# Patient Record
Sex: MALE | Race: WHITE | NOT HISPANIC OR LATINO | URBAN - METROPOLITAN AREA
[De-identification: names, ages, dates, MRNs, and addresses within clinical notes are randomized per-mention and may not be internally consistent; named-entity substitution may affect disease eponyms.]

---

## 2020-09-08 ENCOUNTER — INPATIENT (INPATIENT)
Facility: HOSPITAL | Age: 75
LOS: 3 days | Discharge: HOME HEALTH | DRG: 377 | End: 2020-09-12
Attending: INTERNAL MEDICINE | Admitting: INTERNAL MEDICINE
Payer: MEDICARE

## 2020-09-08 VITALS
TEMPERATURE: 98 F | RESPIRATION RATE: 18 BRPM | OXYGEN SATURATION: 98 % | HEIGHT: 74 IN | HEART RATE: 104 BPM | SYSTOLIC BLOOD PRESSURE: 109 MMHG | DIASTOLIC BLOOD PRESSURE: 78 MMHG | WEIGHT: 199.96 LBS

## 2020-09-08 DIAGNOSIS — D64.9 ANEMIA, UNSPECIFIED: ICD-10-CM

## 2020-09-08 DIAGNOSIS — Z29.9 ENCOUNTER FOR PROPHYLACTIC MEASURES, UNSPECIFIED: ICD-10-CM

## 2020-09-08 DIAGNOSIS — K13.70 UNSPECIFIED LESIONS OF ORAL MUCOSA: ICD-10-CM

## 2020-09-08 DIAGNOSIS — N17.9 ACUTE KIDNEY FAILURE, UNSPECIFIED: ICD-10-CM

## 2020-09-08 DIAGNOSIS — R53.1 WEAKNESS: ICD-10-CM

## 2020-09-08 DIAGNOSIS — R63.4 ABNORMAL WEIGHT LOSS: ICD-10-CM

## 2020-09-08 LAB
ANION GAP SERPL CALC-SCNC: 18 MMOL/L — HIGH (ref 5–17)
APPEARANCE UR: ABNORMAL
BASE EXCESS BLDV CALC-SCNC: -2.7 MMOL/L — SIGNIFICANT CHANGE UP
BILIRUB UR-MCNC: ABNORMAL
BUN SERPL-MCNC: 79 MG/DL — HIGH (ref 7–23)
CA-I SERPL-SCNC: 1.06 MMOL/L — LOW (ref 1.12–1.3)
CALCIUM SERPL-MCNC: 8.9 MG/DL — SIGNIFICANT CHANGE UP (ref 8.4–10.5)
CHLORIDE SERPL-SCNC: 107 MMOL/L — SIGNIFICANT CHANGE UP (ref 96–108)
CO2 SERPL-SCNC: 19 MMOL/L — LOW (ref 22–31)
COLOR SPEC: YELLOW — SIGNIFICANT CHANGE UP
CREAT SERPL-MCNC: 1.26 MG/DL — SIGNIFICANT CHANGE UP (ref 0.5–1.3)
DIFF PNL FLD: NEGATIVE — SIGNIFICANT CHANGE UP
GAS PNL BLDV: 140 MMOL/L — SIGNIFICANT CHANGE UP (ref 138–146)
GAS PNL BLDV: SIGNIFICANT CHANGE UP
GAS PNL BLDV: SIGNIFICANT CHANGE UP
GLUCOSE SERPL-MCNC: 133 MG/DL — HIGH (ref 70–99)
GLUCOSE UR QL: NEGATIVE — SIGNIFICANT CHANGE UP
HCO3 BLDV-SCNC: 24 MMOL/L — SIGNIFICANT CHANGE UP (ref 20–27)
KETONES UR-MCNC: 15 MG/DL
LEUKOCYTE ESTERASE UR-ACNC: NEGATIVE — SIGNIFICANT CHANGE UP
NITRITE UR-MCNC: NEGATIVE — SIGNIFICANT CHANGE UP
PCO2 BLDV: 48 MMHG — SIGNIFICANT CHANGE UP (ref 41–51)
PH BLDV: 7.31 — LOW (ref 7.32–7.43)
PH UR: 5.5 — SIGNIFICANT CHANGE UP (ref 5–8)
PO2 BLDV: 37 MMHG — SIGNIFICANT CHANGE UP
POTASSIUM BLDV-SCNC: 3.8 MMOL/L — SIGNIFICANT CHANGE UP (ref 3.5–4.9)
POTASSIUM SERPL-MCNC: 4.3 MMOL/L — SIGNIFICANT CHANGE UP (ref 3.5–5.3)
POTASSIUM SERPL-SCNC: 4.3 MMOL/L — SIGNIFICANT CHANGE UP (ref 3.5–5.3)
PROT UR-MCNC: NEGATIVE MG/DL — SIGNIFICANT CHANGE UP
SAO2 % BLDV: 56 % — SIGNIFICANT CHANGE UP
SARS-COV-2 RNA SPEC QL NAA+PROBE: SIGNIFICANT CHANGE UP
SODIUM SERPL-SCNC: 144 MMOL/L — SIGNIFICANT CHANGE UP (ref 135–145)
SP GR SPEC: 1.02 — SIGNIFICANT CHANGE UP (ref 1–1.03)
UROBILINOGEN FLD QL: 2 E.U./DL

## 2020-09-08 PROCEDURE — 93010 ELECTROCARDIOGRAM REPORT: CPT

## 2020-09-08 PROCEDURE — 71045 X-RAY EXAM CHEST 1 VIEW: CPT | Mod: 26

## 2020-09-08 PROCEDURE — 99285 EMERGENCY DEPT VISIT HI MDM: CPT | Mod: 25

## 2020-09-08 RX ORDER — SODIUM CHLORIDE 9 MG/ML
1000 INJECTION, SOLUTION INTRAVENOUS
Refills: 0 | Status: DISCONTINUED | OUTPATIENT
Start: 2020-09-08 | End: 2020-09-09

## 2020-09-08 RX ORDER — INFLUENZA VIRUS VACCINE 15; 15; 15; 15 UG/.5ML; UG/.5ML; UG/.5ML; UG/.5ML
0.5 SUSPENSION INTRAMUSCULAR ONCE
Refills: 0 | Status: DISCONTINUED | OUTPATIENT
Start: 2020-09-08 | End: 2020-09-12

## 2020-09-08 RX ORDER — SODIUM CHLORIDE 9 MG/ML
1000 INJECTION INTRAMUSCULAR; INTRAVENOUS; SUBCUTANEOUS ONCE
Refills: 0 | Status: COMPLETED | OUTPATIENT
Start: 2020-09-08 | End: 2020-09-08

## 2020-09-08 RX ORDER — ENOXAPARIN SODIUM 100 MG/ML
40 INJECTION SUBCUTANEOUS EVERY 24 HOURS
Refills: 0 | Status: DISCONTINUED | OUTPATIENT
Start: 2020-09-08 | End: 2020-09-10

## 2020-09-08 RX ADMIN — ENOXAPARIN SODIUM 40 MILLIGRAM(S): 100 INJECTION SUBCUTANEOUS at 20:51

## 2020-09-08 RX ADMIN — SODIUM CHLORIDE 500 MILLILITER(S): 9 INJECTION INTRAMUSCULAR; INTRAVENOUS; SUBCUTANEOUS at 12:26

## 2020-09-08 RX ADMIN — SODIUM CHLORIDE 1000 MILLILITER(S): 9 INJECTION INTRAMUSCULAR; INTRAVENOUS; SUBCUTANEOUS at 14:00

## 2020-09-08 RX ADMIN — SODIUM CHLORIDE 110 MILLILITER(S): 9 INJECTION, SOLUTION INTRAVENOUS at 21:33

## 2020-09-08 RX ADMIN — SODIUM CHLORIDE 1000 MILLILITER(S): 9 INJECTION INTRAMUSCULAR; INTRAVENOUS; SUBCUTANEOUS at 14:45

## 2020-09-08 NOTE — ED ADULT NURSE NOTE - OBJECTIVE STATEMENT
Pt BIBEMS to ED c/o weakness x 2 months. PMHx of CVA 5 years ago. As per pt daughter pt has been increasingly more weak over the last two months and has stopped eating. Pt bed bound, minimally verbal, baseline L sided weakness. 12 lead ekg done.

## 2020-09-08 NOTE — H&P ADULT - NSHPSOURCEINFORD_GEN_ALL_CORE
02/26/18 1100   Group 1   Start Time 0930   Group Name check in     Attendance Not present   Group 2   Start Time 1030   Group Name education   Attendance Not present      Child/Patient

## 2020-09-08 NOTE — H&P ADULT - NSHPPHYSICALEXAM_GEN_ALL_CORE
PHYSICAL EXAM  General: A&Ox3; NAD  Neurological: CNII:XII grossly intact, patient with residual weakness in left arm and leg, however  strength 3+ and arm movement are intact.   Head: NC/AT; MMM; PERRL; EOMI;  Mouth: Patient missing all of his teeth. No trismus, no erythema, abrasions, edema, abscess, uvula mid-line. Patient with poor oral health care, yellow white residue on tongue.  Neck: Supple; no JVD  Respiratory: CTA B/L; no wheezes/crackles   Cardiovascular: Regular rhythm/rate; S1/S2   Gastrointestinal: Soft; NTND; normoactive BS  Extremities: WWP; no edema/cyanosis

## 2020-09-08 NOTE — H&P ADULT - PROBLEM SELECTOR PLAN 4
CHAIM vs CHAIM on CKD vs CKD. Patient with decreased oral intake for two months. Labs showing a BUN of 92 and creatine of 1.72 suggesting a prerenal origin. Patient euvolemic on exam.   -Begin IV NS CHAIM vs CHAIM on CKD vs CKD. Patient with decreased oral intake for two months. Labs showing a BUN of 92 and creatine of 1.72 suggesting a prerenal origin. Patient euvolemic on exam.   -Begin Lactated Ringer 110cc/hr

## 2020-09-08 NOTE — ED PROVIDER NOTE - CLINICAL SUMMARY MEDICAL DECISION MAKING FREE TEXT BOX
px w FTT- with acute kidney injury- mildly acidotic- admit for IV hydration- and rehab placement- px extremely weak- unable to get out of bed  has hha- but they are unable to care for him- admit for probable rehab placement

## 2020-09-08 NOTE — H&P ADULT - PROBLEM SELECTOR PLAN 5
Patient with poor oral intake and recent 20 to 30 lb weight loss and labs revealing a macrocytic anemia. Anemia likely secondary to vitamin deficiency.   -Start thiamine and folate Patient with poor oral intake and recent 20 to 30 lb weight loss and labs revealing a macrocytic anemia. Anemia likely secondary to vitamin deficiency.   -Consider starting B12 and folate after labs

## 2020-09-08 NOTE — H&P ADULT - HISTORY OF PRESENT ILLNESS
The interview for this hospital course was done with the patient along with his daughter who was at bed side.  The patient is a 75 year old  male with a past medical history of CVA (5 years ago currently not on any medication with residual weakness of the left lower leg and left upper extremity limiting him to a mobile scooter), hypertension who arrived to the hospital with a chief complaint of weakness.  The patient stated that his symptoms began approximately two months ago.  He attributes the cause of his current state due to a general mouth discomfort and concern over his wife's health.  The discomfort is located along the lower left gum line, that limits his ability to chew. The discomfort is not made better or worse with anything. He states instead using juicer to blend his meals prior to consuming them.  Over the past two months the patient has had 20 to 30 pound weight loss that he attributes to not eating.  He denies any recent trauma, swelling, erythema, of the mouth and denied any dysphagia.  He denies any fever, night sweats, chills.    The patient stated he has had concern of his wife's health.  The wife is recently had a genitourinary procedure and now has a chronic harmon.  The patient states he feels tired more than normal and has lower energy levels. He states that the COVID environment has made him less happy, but he denies feeling depressed. He denies a loss of interest in activities, feelings of guilt/worthlessness, changes in sleep pattern, concentration, or suicidal/homicidal thoughts.       Patient denying any changes in personality, headache, changes in vision, changes in memory, chest pain, palpitations, arrythmia, sob, cough, sputum production, chest tightness, wheezing, abdominal pain, diarrhea, constipation, dysphagia, no prior history of colonoscopy, polyuria, hematuria, hematochezia, joint pain, peripheral edema, changes in skin moles or pigment.      ED Course:   Vitals: Afebrile, HR 93, /82, SpO2 97% on RA  Labs: WBC 11.08, Hgb 11.6, Hct 34.6, .8, Platelet 246, BUN: 92, Cr 1.72, Glucose of 169, Urine Ketones 15.   Imaging: CXR: No acute pathology   EKG: Sinus tachycardia heart rate 102, no ST segment changes The interview for this hospital course was done with the patient along with his daughter who was at bed side.  The patient is a 75 year old  male with a past medical history of CVA (5 years ago currently not on any medication with residual weakness of the left lower leg and left upper extremity limiting him to a mobility scooter) and hypertension who arrived to the hospital with a chief complaint of weakness.  The patient stated that his symptoms began approximately two months ago.  He attributes the cause of his current state due to a general mouth discomfort and concern over his wife's health.  The discomfort is located along the lower left gum line, that limits his ability to chew. The discomfort is not made better or worse with anything, spread to any other location, and is with him throughout the day and night. He states instead using juicer to blend his meals prior to consuming them.  Over the past two months the patient has had 20 to 30 pound weight loss that he attributes to not eating.  He denies any recent mouth trauma, swelling, erythema, of the and denied any dysphagia.  He denies any fever, night sweats, chills.    The patient stated he has had concern of his wife's health.  The wife  recently had a genitourinary procedure and now has a chronic Fabian which has caused him some distress.  The patient states he feels tired more than normal and has lower energy levels. He states that the COVID environment has made him less happy, but he denies feeling depressed. He denies a loss of interest in activities, feelings of guilt/worthlessness, changes in sleep pattern, concentration, or suicidal/homicidal thoughts.       Patient denying any changes in personality, headache, changes in vision, changes in memory, chest pain, palpitations, arrythmia, sob, cough, sputum production, chest tightness, wheezing, abdominal pain, diarrhea, constipation, dysphagia, no prior history of colonoscopy, polyuria, hematuria, hematochezia, joint pain, peripheral edema, changes in skin moles or pigment.      ED Course:   Vitals: Afebrile, HR 93, /82, SpO2 97% on RA  Labs: WBC 11.08, Hgb 11.6, Hct 34.6, .8, Platelet 246, BUN: 92, Cr 1.72, Glucose of 169, Urine Ketones 15.   Imaging: CXR: No acute pathology   EKG: Sinus tachycardia heart rate 102, no ST segment changes The interview for this hospital course was done with the patient along with his daughter who was at bed side.  The patient is a 75 year old  male with a past medical history of CVA (5 years ago currently not on any medication with residual weakness of the left lower leg and left upper extremity limiting him to a mobility scooter) and hypertension who arrived to the hospital with a chief complaint of weakness.  The patient stated that his symptoms began approximately two months ago.  He attributes the cause of his current state due to a general mouth discomfort and concern over his wife's health.  The discomfort is located along the lower left gum line, that limits his ability to chew. The discomfort is not made better or worse with anything, spread to any other location, and is with him throughout the day and night. He states he uses a juicer to blend his meals prior to consuming them.  Over the past two months the patient has had 20 to 30 pound weight loss that he attributes to not eating.  He denies any recent mouth trauma, swelling, erythema, dysphagia, fever, night sweats, chills.    The patient stated he has had concern of his wife's health.  The wife recently had a genitourinary procedure and now has a chronic Fabian which has caused him some distress.  The patient states he feels tired more than normal and has lower energy levels. He states that the COVID environment has made him less happy, but he denies feeling depressed. He denies a loss of interest in activities, feelings of guilt/worthlessness, changes in sleep pattern, concentration, or suicidal/homicidal thoughts.       Patient denying any changes in personality, headache, changes in vision, changes in memory, chest pain, palpitations, arrythmia, sob, cough, sputum production, chest tightness, wheezing, abdominal pain, diarrhea, constipation, dysphagia, no prior history of colonoscopy, polyuria, hematuria, hematochezia, joint pain, peripheral edema, changes in skin moles or pigment.      ED Course:   Vitals: Afebrile, HR 93, /82, SpO2 97% on RA  Labs: WBC 11.08, Hgb 11.6, Hct 34.6, .8, Platelet 246, BUN: 92, Cr 1.72, Glucose of 169, Urine Ketones 15.   Imaging: CXR: No acute pathology   EKG: Sinus tachycardia heart rate 102, no ST segment changes

## 2020-09-08 NOTE — ED ADULT NURSE NOTE - OTHER COMPLAINTS
rimaa from home per daughter pt has not been eating or drinking over the last two months. States "he is not as active" Pt is from home and lives with wife

## 2020-09-08 NOTE — H&P ADULT - PROBLEM SELECTOR PLAN 3
Patient with 2 month history of vague mouth discomfort.  Patient not complaining of any mouth pain. On outer inspection no sign of abscess, erythema, edema, other gross abnormalities.  -Lidocaine swish and spit Patient with 2 month history of vague mouth discomfort.  Patient not complaining of any mouth pain. On outer inspection no sign of abscess, erythema, edema, or other gross abnormalities.  -Lidocaine swish and spit

## 2020-09-08 NOTE — ED PROVIDER NOTE - DIAGNOSTIC INTERPRETATION
ER Physician: Bi Humphreys  CHEST XRAY INTERPRETATION: lungs clear, heart shadow normal, bony structures intact

## 2020-09-08 NOTE — ED PROVIDER NOTE - CARE PLAN
Principal Discharge DX:	Acute renal failure, unspecified acute renal failure type  Secondary Diagnosis:	Weakness

## 2020-09-08 NOTE — ED PROVIDER NOTE - CONSTITUTIONAL, MLM
normal... thin elderly male Well appearing, awake, alert, oriented to person, place, time/situation and in no apparent distress.

## 2020-09-08 NOTE — H&P ADULT - ASSESSMENT
[age] year old  male with a past medical history of CVA with residual weakness in left arm and leg (currently medication noncompliant) and hypertension, who presented to the ED with a complaint of weakness found to be dehydrated and admitted for physical therapy and social work.

## 2020-09-08 NOTE — H&P ADULT - NSHPLABSRESULTS_GEN_ALL_CORE
LABS                        11.6   11. )-----------( 246      ( 08 Sep 2020 12:29 )             34.6         144  |  102  |  92<H>  ----------------------------<  169<H>  4.5   |  24  |  1.72<H>    Ca    9.2      08 Sep 2020 12:29    TPro  6.9  /  Alb  3.5  /  TBili  1.1  /  DBili  x   /  AST  14  /  ALT  7<L>  /  AlkPhos  31<L>      PT/INR - ( 08 Sep 2020 12:29 )   PT: 13.5 sec;   INR: 1.13          PTT - ( 08 Sep 2020 12:29 )  PTT:27.8 sec    Urinalysis Basic - ( 08 Sep 2020 15:47 )    Color: Yellow / Appearance: SL Cloudy / S.020 / pH: x  Gluc: x / Ketone: 15 mg/dL  / Bili: Small / Urobili: 2.0 E.U./dL   Blood: x / Protein: NEGATIVE mg/dL / Nitrite: NEGATIVE   Leuk Esterase: NEGATIVE / RBC: x / WBC x   Sq Epi: x / Non Sq Epi: x / Bacteria: x    CXR: Single AP view of the chest is submitted. Studies limited by suboptimal positioning. Heart size is within normal limits. No pulmonary consolidation is seen. Skinfold overlies the upper chest. No pleural effusion or pneumothorax is identified.     Impression: No acute pathology.

## 2020-09-08 NOTE — ED PROVIDER NOTE - OBJECTIVE STATEMENT
75 M w weakness, hx of HTN, CVA L sided weakness - has taken himself off meds several months ago - does not want to take meds- stopped eating, refusing to eat over the past 3 months, losing weight, increasingly dehydrated- daughter states he is depressed  px denies abd pain, last bm yesterday  no f/c no cough  no exac/allev factors  moderate severity  at baseline - able to stand and get himself on his scooter- now unable to get out of bed

## 2020-09-08 NOTE — H&P ADULT - PROBLEM SELECTOR PLAN 6
F: NS   E: Repleat K>4 Mg>2  N: Thick Nectar Diet  DVT: Eliquis   C: Full Code F: Lactated ringers 110 cc/hr  E: Repleat K>4 Mg>2  N: Thick Nectar Diet  DVT: Eliquis   C: Full Code F: Lactated ringers 110 cc/hr  E: Repleat K>4 Mg>2  N: Thick Nectar Diet  DVT: Lovenox   C: Full Code

## 2020-09-08 NOTE — H&P ADULT - PROBLEM SELECTOR PLAN 2
Patient with a reported history of weight a 20 to 30 lb weight loss. Patient denies increased physical activity but states he has not been eating well for the past two months, due to mouth discomfort. Denies any history of cancer or family history of cancer.  Patient denies depression.    -Weight loss likely secondary to malnutrition  -Patient denies having a colposcopy performed, will follow up with GI as outpatient Patient with a reported history of weight a 20 to 30 lb weight loss. Patient denies increased physical activity but states he has not been eating well for the past two months, due to mouth discomfort. Denies any history of cancer or family history of cancer.  Patient denies depression. Weight loss likely secondary to malnutrition. Patient denies having a colposcopy performed, will follow up with GI as outpatient.  -Nutrition consult  -Start Thick nectar diet

## 2020-09-08 NOTE — ED ADULT NURSE NOTE - NSIMPLEMENTINTERV_GEN_ALL_ED
Implemented All Fall Risk Interventions:  [place] to call system. Call bell, personal items and telephone within reach. Instruct patient to call for assistance. Room bathroom lighting operational. Non-slip footwear when patient is off stretcher. Physically safe environment: no spills, clutter or unnecessary equipment. Stretcher in lowest position, wheels locked, appropriate side rails in place. Provide visual cue, wrist band, yellow gown, etc. Monitor gait and stability. Monitor for mental status changes and reorient to person, place, and time. Review medications for side effects contributing to fall risk. Reinforce activity limits and safety measures with patient and family.

## 2020-09-08 NOTE — H&P ADULT - PROBLEM SELECTOR PLAN 1
Patient with a history of 2 months of poor oral intake secondary to mouth discomfort and social stress from wife's health.  Patient with no new focal neurological defects, not hypoglycemic, no evidence of ACS, no active bleeding. Patient does have a macrocytic anemia, and thyroid function not yet determined, BUN to creatine ratio greater than 20:1 along with ketones in the urine suggesting poor oral intake.   -Likely secondary to poor oral intake due to social condition and mouth discomfort  -Encourage PO intake  -Order TSH  -Begin thiamine and folate Patient with a history of 2 months of poor oral intake secondary to mouth discomfort and social stress from wife's health.  Patient with no new focal neurological defects, not hypoglycemic, no evidence of ACS, no active bleeding. Patient does have a macrocytic anemia, and thyroid function not yet determined, BUN to creatine ratio greater than 20:1 along with ketones in the urine suggesting poor oral intake.   -Likely secondary to poor oral intake due to social condition and mouth discomfort  -Encourage PO intake  -Order TSH  -Order B12 and Folate level  -Social Work and PT consult

## 2020-09-09 DIAGNOSIS — F32.9 MAJOR DEPRESSIVE DISORDER, SINGLE EPISODE, UNSPECIFIED: ICD-10-CM

## 2020-09-09 DIAGNOSIS — D53.9 NUTRITIONAL ANEMIA, UNSPECIFIED: ICD-10-CM

## 2020-09-09 DIAGNOSIS — E87.2 ACIDOSIS: ICD-10-CM

## 2020-09-09 DIAGNOSIS — R62.7 ADULT FAILURE TO THRIVE: ICD-10-CM

## 2020-09-09 DIAGNOSIS — R29.818 OTHER SYMPTOMS AND SIGNS INVOLVING THE NERVOUS SYSTEM: ICD-10-CM

## 2020-09-09 PROBLEM — Z00.00 ENCOUNTER FOR PREVENTIVE HEALTH EXAMINATION: Status: ACTIVE | Noted: 2020-09-09

## 2020-09-09 LAB
CULTURE RESULTS: SIGNIFICANT CHANGE UP
SPECIMEN SOURCE: SIGNIFICANT CHANGE UP

## 2020-09-09 PROCEDURE — 99223 1ST HOSP IP/OBS HIGH 75: CPT

## 2020-09-09 PROCEDURE — 99223 1ST HOSP IP/OBS HIGH 75: CPT | Mod: GC,AI

## 2020-09-09 RX ORDER — PREGABALIN 225 MG/1
1000 CAPSULE ORAL DAILY
Refills: 0 | Status: DISCONTINUED | OUTPATIENT
Start: 2020-09-09 | End: 2020-09-11

## 2020-09-09 RX ORDER — MIRTAZAPINE 45 MG/1
7.5 TABLET, ORALLY DISINTEGRATING ORAL AT BEDTIME
Refills: 0 | Status: DISCONTINUED | OUTPATIENT
Start: 2020-09-09 | End: 2020-09-12

## 2020-09-09 RX ORDER — SODIUM CHLORIDE 9 MG/ML
1000 INJECTION, SOLUTION INTRAVENOUS
Refills: 0 | Status: DISCONTINUED | OUTPATIENT
Start: 2020-09-09 | End: 2020-09-10

## 2020-09-09 RX ORDER — SODIUM CHLORIDE 9 MG/ML
1000 INJECTION, SOLUTION INTRAVENOUS
Refills: 0 | Status: DISCONTINUED | OUTPATIENT
Start: 2020-09-09 | End: 2020-09-09

## 2020-09-09 RX ADMIN — SODIUM CHLORIDE 110 MILLILITER(S): 9 INJECTION, SOLUTION INTRAVENOUS at 18:03

## 2020-09-09 RX ADMIN — SODIUM CHLORIDE 110 MILLILITER(S): 9 INJECTION, SOLUTION INTRAVENOUS at 09:44

## 2020-09-09 RX ADMIN — MIRTAZAPINE 7.5 MILLIGRAM(S): 45 TABLET, ORALLY DISINTEGRATING ORAL at 22:01

## 2020-09-09 RX ADMIN — ENOXAPARIN SODIUM 40 MILLIGRAM(S): 100 INJECTION SUBCUTANEOUS at 18:01

## 2020-09-09 NOTE — PROGRESS NOTE ADULT - PROBLEM SELECTOR PLAN 4
CHAIM vs CHAIM on CKD vs CKD. Patient with decreased oral intake for two months. Labs showing a BUN of 92 and creatine of 1.72 suggesting a prerenal origin. Patient euvolemic on exam.   -Begin Lactated Ringer 110cc/hr Pt experiencing a lot of stress due to wife's health, as well as physical limitations from CVA  -obtain collateral from daughter  -consider starting Mirtazapine 15mg PO qd at bedtime Pt experiencing a lot of stress due to wife's health, as well as physical limitations from CVA.  -obtain collateral from daughter  -consider starting Mirtazapine 15mg PO qd at bedtime

## 2020-09-09 NOTE — BEHAVIORAL HEALTH ASSESSMENT NOTE - DIFFERENTIAL
Unspecified depression, consider MDD, single episode, moderate, depression secondary to a general medical condition  Unspecified neurocognitive impairment

## 2020-09-09 NOTE — BEHAVIORAL HEALTH ASSESSMENT NOTE - RISK ASSESSMENT
Low Acute Suicide Risk absence of any current or past SI, future-oriented, multiple protective factors including family, help-seeking behaviors, residential and financial stability. +risk factor of current mood episode with risk mitigated by

## 2020-09-09 NOTE — PHYSICAL THERAPY INITIAL EVALUATION ADULT - IMPAIRMENTS FOUND, PT EVAL
aerobic capacity/endurance/poor safety awareness/gait, locomotion, and balance/gross motor/posture/muscle strength

## 2020-09-09 NOTE — SWALLOW BEDSIDE ASSESSMENT ADULT - SWALLOW EVAL: FEEDING ASSISTANCE
set up only required/Pt wishes to feed self indep, but benefits from assist with tray set-up d/t residual UE weakness

## 2020-09-09 NOTE — PHYSICAL THERAPY INITIAL EVALUATION ADULT - CRITERIA FOR SKILLED THERAPEUTIC INTERVENTIONS
impairments found/risk reduction/prevention/therapy frequency/rehab potential/predicted duration of therapy intervention/anticipated equipment needs at discharge/anticipated discharge recommendation/functional limitations in following categories

## 2020-09-09 NOTE — SWALLOW BEDSIDE ASSESSMENT ADULT - SLP PERTINENT HISTORY OF CURRENT PROBLEM
76 yo M w/ a PMHx of CVA (weakness LUE/LLE with mobility limited to scooter) and HTN admitted for failure to thrive, likely due to poor PO intake with 20-30 lb wt loss. Found to have macrocytic anemia, AG metabolic acidosis, and CHAIM.

## 2020-09-09 NOTE — BEHAVIORAL HEALTH ASSESSMENT NOTE - NSBHCHARTREVIEWLAB_PSY_A_CORE FT
CBC with WBC 11.08, Hgb 11.6, .8  CMP with elevated BUN, Cr, grossly normal LFTs  UA with ketones, bilirubin  no utox

## 2020-09-09 NOTE — PHYSICAL THERAPY INITIAL EVALUATION ADULT - PLANNED THERAPY INTERVENTIONS, PT EVAL
motor coordination training/strengthening/balance training/transfer training/postural re-education/bed mobility training

## 2020-09-09 NOTE — PROGRESS NOTE ADULT - PROBLEM SELECTOR PLAN 1
Patient with a history of 2 months of poor oral intake secondary to mouth discomfort and social stress from wife's health.  Patient with no new focal neurological defects, not hypoglycemic, no evidence of ACS, no active bleeding. Patient does have a macrocytic anemia, and thyroid function not yet determined, BUN to creatine ratio greater than 20:1 along with ketones in the urine suggesting poor oral intake.   -Likely secondary to poor oral intake due to social condition and mouth discomfort  -Encourage PO intake  -Order TSH  -Order B12 and Folate level  -Social Work and PT consult Pt lost 20-30lbs over the past 2mo. Likely 2/2 poor PO intake from mouth discomfort and stress from wife's health.   -f/u speech and swallow  -f/u nutrition consult  -f/u TSH  -f/u PT consult  -f/u social work

## 2020-09-09 NOTE — PROGRESS NOTE ADULT - PROBLEM SELECTOR PLAN 6
F: Lactated ringers 110 cc/hr  E: Repleat K>4 Mg>2  N: Thick Nectar Diet  DVT: Lovenox   C: Full Code Cr 1.72 on admission, now improved to 1.26 s/p IV. Likely pre-renal 2/2 hypovolemia from poor PO intake.   -continue to monitor BMP

## 2020-09-09 NOTE — CONSULT NOTE ADULT - ASSESSMENT
per Internal Medicine    76 yo M w/ a PMHx of CVA (weakness LUE/LLE with mobility limited to scooter) and HTN admitted for failure to thrive, likely due to poor PO intake. Found to have macrocytic anemia, AG metabolic acidosis, and CHAIM.    Problem/Plan - 1:  ·  Problem: Failure to thrive in adult.  Plan: Pt lost 20-30lbs over the past 2mo. Likely 2/2 poor PO intake from mouth discomfort and stress from wife's health.   -f/u speech and swallow  -f/u nutrition consult  -f/u TSH  -f/u PT consult  -f/u social work.     Problem/Plan - 2:  ·  Problem: Metabolic acidosis, increased anion gap.  Plan: Pt found to have AG metabolic acidosis likely 2/2 starvation ketosis from poor PO intake. UA positive for ketones suggestive of starvation ketosis  -D5-NS 1L @ 110cc/h  -encourage PO intake.     Problem/Plan - 3:  ·  Problem: Macrocytic anemia.  Plan: Hgb 11.6 and  on admission. Possibly 2/2 B12/folate deficiency due to poor PO intake  -f/u B12 and Folate.     Problem/Plan - 4:  ·  Problem: R/O Depression.  Plan: Pt experiencing a lot of stress due to wife's health, as well as physical limitations from CVA.  -obtain collateral from daughter  -consider starting Mirtazapine 15mg PO qd at bedtime.    Problem/Plan - 5:  ·  Problem: Mouth problem.  Plan: Pt has hx of poor PO intake for 2mo. Poor dentition. Missing all teeth. Denies tenderness to palpation in jaw. No erythema, exudates or thrush observed in oral cavity.   -Pureed diet.     Problem/Plan - 6:  Problem: CHAIM (acute kidney injury). Plan: Cr 1.72 on admission, now improved to 1.26 s/p IV. Likely pre-renal 2/2 hypovolemia from poor PO intake.   -continue to monitor BMP.    Problem/Plan - 7:  ·  Problem: Prophylactic measure.  Plan: F: D5+NS 110cc/h  E: Repleat K>4 Mg>2  N: Pureed Diet  DVT: Lovenox   C: Full Code.

## 2020-09-09 NOTE — PHYSICAL THERAPY INITIAL EVALUATION ADULT - BALANCE DISTURBANCE, IDENTIFIED IMPAIRMENT CONTRIBUTE, REHAB EVAL
impaired coordination/impaired postural control/abnormal muscle tone/decreased strength/impaired motor control

## 2020-09-09 NOTE — DIETITIAN INITIAL EVALUATION ADULT. - OTHER INFO
74y/o male with history of CVA with left arm and leg residuals, HTN, presents with weakness/dehydration and reported 20-30lb weight loss over 2 months. Per patient, he lost some of his dentures and needs to take blenderized food due to pain in mouth. No N/V/D/C. Skin with skin tears/scratches and stage 1 sacrum.Patient reported he was putting all his food in a  and drinking it.Claims he was taking healthy stuff.Able to take fluids per patient, but seems to have some issues with pureed foods.SLP consult pending.Noted suspected starvation ketosis due to +UA.Suspected severe PCM due to weight loss/inadequate oral intake and noted loss of fat and muscle in arms and legs on NFPE.Patient is presently 99% of IBW,UBW:175-180lbs .

## 2020-09-09 NOTE — CONSULT NOTE ADULT - SUBJECTIVE AND OBJECTIVE BOX
Patient is a 75y old  Male who presents with a chief complaint of Weakness (09 Sep 2020 07:22)       HPI:  The interview for this hospital course was done with the patient along with his daughter who was at bed side.  The patient is a 75 year old  male with a past medical history of CVA (5 years ago currently not on any medication with residual weakness of the left lower leg and left upper extremity limiting him to a mobility scooter) and hypertension who arrived to the hospital with a chief complaint of weakness.  The patient stated that his symptoms began approximately two months ago.  He attributes the cause of his current state due to a general mouth discomfort and concern over his wife's health.  The discomfort is located along the lower left gum line, that limits his ability to chew. The discomfort is not made better or worse with anything, spread to any other location, and is with him throughout the day and night. He states he uses a juicer to blend his meals prior to consuming them.  Over the past two months the patient has had 20 to 30 pound weight loss that he attributes to not eating.  He denies any recent mouth trauma, swelling, erythema, dysphagia, fever, night sweats, chills.    The patient stated he has had concern of his wife's health.  The wife recently had a genitourinary procedure and now has a chronic Fabian which has caused him some distress.  The patient states he feels tired more than normal and has lower energy levels. He states that the COVID environment has made him less happy, but he denies feeling depressed. He denies a loss of interest in activities, feelings of guilt/worthlessness, changes in sleep pattern, concentration, or suicidal/homicidal thoughts.       Patient denying any changes in personality, headache, changes in vision, changes in memory, chest pain, palpitations, arrythmia, sob, cough, sputum production, chest tightness, wheezing, abdominal pain, diarrhea, constipation, dysphagia, no prior history of colonoscopy, polyuria, hematuria, hematochezia, joint pain, peripheral edema, changes in skin moles or pigment.      ED Course:   Vitals: Afebrile, HR 93, /82, SpO2 97% on RA  Labs: WBC 11.08, Hgb 11.6, Hct 34.6, .8, Platelet 246, BUN: 92, Cr 1.72, Glucose of 169, Urine Ketones 15.   Imaging: CXR: No acute pathology   EKG: Sinus tachycardia heart rate 102, no ST segment changes (08 Sep 2020 17:39)      PAST MEDICAL & SURGICAL HISTORY:  Cerebrovascular accident (CVA)  HTN (hypertension)  No significant past surgical history      MEDICATIONS  (STANDING):  cyanocobalamin 1000 MICROGram(s) Oral daily  dextrose 5% + sodium chloride 0.9%. 1000 milliLiter(s) (110 mL/Hr) IV Continuous <Continuous>  enoxaparin Injectable 40 milliGRAM(s) SubCutaneous every 24 hours  influenza   Vaccine 0.5 milliLiter(s) IntraMuscular once    MEDICATIONS  (PRN):      FAMILY HISTORY:  No pertinent family history in first degree relatives      CBC Full  -  ( 08 Sep 2020 12:29 )  WBC Count : 11.08 K/uL  RBC Count : 3.24 M/uL  Hemoglobin : 11.6 g/dL  Hematocrit : 34.6 %  Platelet Count - Automated : 246 K/uL  Mean Cell Volume : 106.8 fl  Mean Cell Hemoglobin : 35.8 pg  Mean Cell Hemoglobin Concentration : 33.5 gm/dL  Auto Neutrophil # : 9.13 K/uL  Auto Lymphocyte # : 1.85 K/uL  Auto Monocyte # : 0.10 K/uL  Auto Eosinophil # : 0.00 K/uL  Auto Basophil # : 0.00 K/uL  Auto Neutrophil % : 82.4 %  Auto Lymphocyte % : 16.7 %  Auto Monocyte % : 0.9 %  Auto Eosinophil % : 0.0 %  Auto Basophil % : 0.0 %          144  |  107  |  79<H>  ----------------------------<  133<H>  4.3   |  19<L>  |  1.26    Ca    8.9      08 Sep 2020 20:04    TPro  6.9  /  Alb  3.5  /  TBili  1.1  /  DBili  x   /  AST  14  /  ALT  7<L>  /  AlkPhos  31<L>        Urinalysis Basic - ( 08 Sep 2020 15:47 )    Color: Yellow / Appearance: SL Cloudy / S.020 / pH: x  Gluc: x / Ketone: 15 mg/dL  / Bili: Small / Urobili: 2.0 E.U./dL   Blood: x / Protein: NEGATIVE mg/dL / Nitrite: NEGATIVE   Leuk Esterase: NEGATIVE / RBC: x / WBC x   Sq Epi: x / Non Sq Epi: x / Bacteria: x          Radiology:    < from: Xray Chest 1 View- PORTABLE-Urgent (20 @ 12:41) >  EXAM:  XR CHEST PORTABLE URGENT 1V                          PROCEDURE DATE:  2020          INTERPRETATION:  Chest x-ray    Indication: Weakness    Prior studies: None    Single AP view of the chest is submitted. Studies limited by suboptimal positioning. Heart size is within normal limits.  No pulmonary consolidation is seen. Skinfold overlies the upper chest. No pleural effusion or pneumothorax is identified.    Impression: No acute pathology.            Vital Signs Last 24 Hrs  T(C): 36.4 (09 Sep 2020 06:11), Max: 37.1 (08 Sep 2020 19:02)  T(F): 97.6 (09 Sep 2020 06:11), Max: 98.8 (08 Sep 2020 19:02)  HR: 84 (09 Sep 2020 06:11) (84 - 97)  BP: 139/84 (09 Sep 2020 06:11) (120/82 - 148/93)  BP(mean): --  RR: 18 (09 Sep 2020 06:11) (18 - 18)  SpO2: 98% (09 Sep 2020 06:11) (96% - 98%)    REVIEW OF SYSTEMS: per HPI          Physical Exam: frail 76 yo  gentleman lying in semi Byrne's position, no acute complaints    Head: normocephalic, atraumatic    Eyes: PERRLA, EOMI, no nystagmus, sclera anicteric    ENT: nasal discharge, uvula midline, no oropharyngeal erythema/exudate    Neck: supple, negative JVD, negative carotid bruits, no thyromegaly    Chest: CTA bilaterally, neg wheeze/ rhonchi/ rales/ crackles/ egophany    Cardiovascular: regular rate and rhythm, neg murmurs/rubs/gallops    Abdomen: soft, non distended, non tender to palpation in all 4 quadrants, negative rebound/guarding, normal bowel sounds    Extremities: WWP, neg cyanosis/clubbing/edema, negative calf tenderness to palpation, negative Antonio's sign    Musculoskeletal:      :     Neurologic Exam:    Alert and oriented to person, place, date/year, speech fluent w/ ? dysarthria vs adentulous, recent and remote memory intact, repetition intact, comprehension intact      Motor Exam:    Upper Extremities:     Right:   poor effort > 3+/5    Left:      poor effort <3/5      Lower Extremities:    Right:    poor effort > 3+/5    Left :    poor effort <3/5               Sensation: Intact to light touch bilaterally. No neglect or extinction on double simultaneous testing.                       DTR:            = biceps/     triceps/     brachioradialis                      = patella/   medial hamstring/ankle                      neg clonus                      neg Babinski                          Gait:  not tested        PM&R Impression:    1) deconditioned  2) h/o CVA with residual hemiparesis < 3/5 range    Plan:    1) Physical therapy focusing on therapeutic exercises, bed mobility/transfer out of bed evaluation, progressive ambulation with assistive devices prn.    2) Anticipated Disposition Plan/Recs:   pending functional progress

## 2020-09-09 NOTE — PHYSICAL THERAPY INITIAL EVALUATION ADULT - ASSISTIVE DEVICE, REHAB EVAL
Head , normocephalic , atraumatic , Face , Face within normal limits , Ears , External ears within normal limits , Nose/Nasopharynx , External nose  normal appearance , Mouth and Throat , Oral cavity appearance normal , Head , normocephalic , atraumatic , Face , Face within normal limits , Ears , External ears within normal limits , Nose/Nasopharynx , External nose  normal appearance , Mouth and Throat , Oral cavity appearance normal bed rails

## 2020-09-09 NOTE — DIETITIAN INITIAL EVALUATION ADULT. - PROBLEM SELECTOR PLAN 6
F: Lactated ringers 110 cc/hr  E: Repleat K>4 Mg>2  N: Thick Nectar Diet  DVT: Lovenox   C: Full Code

## 2020-09-09 NOTE — PHYSICAL THERAPY INITIAL EVALUATION ADULT - DIAGNOSIS, PT EVAL
5A: Primary prevention/risk reduction for loss of balance and falling ; 6B: impaired aerobic capacity/endurance associated with deconditioning

## 2020-09-09 NOTE — CHART NOTE - NSCHARTNOTEFT_GEN_A_CORE
Spoke with patient's daughter at bedside. States her father consumes very few calories per day (said ~100 calories on a good day). Said her father is an extremely picky eater - eats mainly fruits (grapes, peaches) and no meat.     Pt's daughter confirmed her father takes Vit B12 1000mcg qd supplements.

## 2020-09-09 NOTE — PHYSICAL THERAPY INITIAL EVALUATION ADULT - GENERAL OBSERVATIONS, REHAB EVAL
Pt received semi-supine no acute distress +[place] cath, cleared for PT by ASIM Bond, agreeable to PT Eval, daughter present. Left as found +Call bell +Bed alarm, RN aware. FIM 0

## 2020-09-09 NOTE — PHYSICAL THERAPY INITIAL EVALUATION ADULT - TRANSFER SAFETY CONCERNS NOTED: SIT/STAND, REHAB EVAL
decreased safety awareness/losing balance/decreased sequencing ability/decreased balance during turns/decreased step length/decreased weight-shifting ability

## 2020-09-09 NOTE — SWALLOW BEDSIDE ASSESSMENT ADULT - PHARYNGEAL PHASE
hyolaryngeal excursion palpated during swallow trigger, appears brisk & timely. No overt signs of aspiration noted.

## 2020-09-09 NOTE — PHYSICAL THERAPY INITIAL EVALUATION ADULT - IMPAIRMENTS CONTRIBUTING IMPAIRED BED MOBILITY, REHAB EVAL
impaired motor control/impaired balance/impaired postural control/decreased strength/impaired coordination/abnormal muscle tone

## 2020-09-09 NOTE — DIETITIAN INITIAL EVALUATION ADULT. - PHYSICAL APPEARANCE
debilitated/Per physical assessment: Muscle Wasting- Temporal [   ]  Clavicle/Pectoral [ x  ]  Shoulder/Deltoid [   ]  Scapula [   ]  Interosseous [   ]  Quadriceps [ x  ]  Gastrocnemius [   ]; Fat Wasting- Orbital [   ]  Buccal [   ]  Triceps [  x ]  Rib [   ]--> Suspect severe [PCM] 2/2 to physical assessment, [poor intake], and [wt loss]; please see malnutrition chart note.

## 2020-09-09 NOTE — DIETITIAN INITIAL EVALUATION ADULT. - PROBLEM SELECTOR PLAN 2
Patient with a reported history of weight a 20 to 30 lb weight loss. Patient denies increased physical activity but states he has not been eating well for the past two months, due to mouth discomfort. Denies any history of cancer or family history of cancer.  Patient denies depression. Weight loss likely secondary to malnutrition. Patient denies having a colposcopy performed, will follow up with GI as outpatient.  -Nutrition consult  -Start Thick nectar diet

## 2020-09-09 NOTE — PHYSICAL THERAPY INITIAL EVALUATION ADULT - IMPAIRED TRANSFERS: SIT/STAND, REHAB EVAL
impaired balance/impaired postural control/impaired coordination/abnormal muscle tone/impaired motor control/decreased strength

## 2020-09-09 NOTE — DIETITIAN INITIAL EVALUATION ADULT. - PROBLEM SELECTOR PLAN 5
Patient with poor oral intake and recent 20 to 30 lb weight loss and labs revealing a macrocytic anemia. Anemia likely secondary to vitamin deficiency.   -Consider starting B12 and folate after labs

## 2020-09-09 NOTE — PROGRESS NOTE ADULT - PROBLEM SELECTOR PLAN 3
Patient with 2 month history of vague mouth discomfort.  Patient not complaining of any mouth pain. On outer inspection no sign of abscess, erythema, edema, or other gross abnormalities.  -Lidocaine swish and spit Hgb 11.6 and  on admission. Possibly 2/2 B12/folate deficiency due to poor PO intake  -f/u B12 and Folate

## 2020-09-09 NOTE — PROGRESS NOTE ADULT - PROBLEM SELECTOR PLAN 2
Patient with a reported history of weight a 20 to 30 lb weight loss. Patient denies increased physical activity but states he has not been eating well for the past two months, due to mouth discomfort. Denies any history of cancer or family history of cancer.  Patient denies depression. Weight loss likely secondary to malnutrition. Patient denies having a colposcopy performed, will follow up with GI as outpatient.  -Nutrition consult  -Start Thick nectar diet Pt found to have AG metabolic acidosis likely 2/2 starvation ketosis from poor PO intake.   -D5-NS 1L @ 110cc/h Pt found to have AG metabolic acidosis likely 2/2 starvation ketosis from poor PO intake. UA positive for ketones suggestive of starvation ketosis  -D5-NS 1L @ 110cc/h  -encourage PO intake

## 2020-09-09 NOTE — PROGRESS NOTE ADULT - ASSESSMENT
[age] year old  male with a past medical history of CVA with residual weakness in left arm and leg (currently medication noncompliant) and hypertension, who presented to the ED with a complaint of weakness found to be dehydrated and admitted for physical therapy and social work. 74 yo M w/ a PMHx of CVA (weakness LUE/LLE with mobility limited to scooter) and HTN admitted for dehydration and poor PO intake, found to have macrocytic anemia and AG metabolic acidosis. 76 yo M w/ a PMHx of CVA (weakness LUE/LLE with mobility limited to scooter) and HTN admitted for failure to thrive, likely due to poor PO intake. Found to have macrocytic anemia, AG metabolic acidosis, and CHAIM.

## 2020-09-09 NOTE — BEHAVIORAL HEALTH ASSESSMENT NOTE - SUMMARY
RECOMMENDATIONS  -no need for sitter or indication for psychiatric admission  -agree with medical w/u for potential contributors to mood/cognitive decline - f/u TSH, B12, folate  -reasonable to consider use of mirtazapine for depressive symptoms and appetite stimulation, though pt declines use at this time despite psychoeducation. Will return to discuss further   -please recheck EKG for QTc prolongation. Mirtazapine unlikely to have significant impact on QTc but could be monitored with initiation. Would also check and maintain K and Mg at high-normal levels.  -collateral pending from family  -will provide referral for outpatient psychiatric care, though pt reluctant to consider at this time  -no psychiatric barrier to discharge. Will follow while in hospital. 74yo man, , originally from [place], with no formal psychiatric history, medical history of HTN and CVA ~5 years ago with residual weakness and dysarthria who presents with depressive symptoms, functional decline, and mild cognitive impairment (short term memory deficits) in setting of recent poor oral intake and stressor of wife's illness. Evaluation somewhat limited due to pt's dysarthria, but it appears that depression (low mood related to his wife's decline) may be contributing to pt's decreased oral intake and low energy over the last several months. It is also possible that nutritional deficiencies, thyroid function, or other medical factors (?unclear baseline s/p CVA) may be contributing to depressive symptoms and cognitive decline.     Encourage further medical w/u and pending collateral information from daughter to clarify history. It is reasonable to consider use of mirtazapine to target depressive symptoms and appetite, though would monitor if contributes to further daytime fatigue. Mirtazapine is unlikely to significantly contribute to QTc prolongation (noted on admission), though would monitor QTc with initiation given risk of arrhythmia, particularly if other risk factors such as electrolyte imbalances present.     No evidence of any acute suicidality or other safety concerns on assessment that would warrant involuntary inpatient psychiatric care. Pt appears future-oriented and motivated for treatment, though with limited psychological awareness. Will provide referral information for outpatient mental health treatment and return to provide further psychoeducation about potential benefits of medication and psychotherapy.     RECOMMENDATIONS  -no need for sitter or indication for psychiatric admission  -agree with medical w/u for potential contributors to mood/cognitive decline - f/u TSH, B12, folate  -reasonable to consider use of mirtazapine 7.5mg QHS for depressive symptoms and appetite stimulation, though pt declines use at this time despite psychoeducation. Recommend offering tonight and will return to discuss further   -please recheck EKG for QTc prolongation. Mirtazapine unlikely to have significant impact on QTc but should be monitored with initiation. Would also check and maintain K and Mg at high-normal levels.  -collateral pending from family  -will provide referral for outpatient psychiatric care, though pt reluctant to consider at this time  -no psychiatric barrier to discharge. Will follow while in hospital.

## 2020-09-09 NOTE — PHYSICAL THERAPY INITIAL EVALUATION ADULT - ADDITIONAL COMMENTS
pt reports he does not ambulate at baseline 2/2 prior CVA, uses motorized scooter at baseline which he can typically transfer in/out of with little to no assistance; has home aide 12 hrs daily.

## 2020-09-09 NOTE — BEHAVIORAL HEALTH ASSESSMENT NOTE - HPI (INCLUDE ILLNESS QUALITY, SEVERITY, DURATION, TIMING, CONTEXT, MODIFYING FACTORS, ASSOCIATED SIGNS AND SYMPTOMS)
74yo man, , originally from [place], with no formal psychiatric history, medical history of HTN and CVA ?5 years ago with residual weakness and dysarthria who was brought to the ED by EMS activated by daughter due to concern for poor oral intake and low energy over several months, with 20-30lb weight loss. Pt admitted to medicine for management of failure to thrive. Psychiatry consulted to assess for potential role of depression in pt's decline.    Per chart review and d/w primary team, pt with no formal history of depression, takes no medications at home. Recent stressors has been his wife's illness, which pt's daughter believes to be a major factor in pt's poor oral intake and suspected low mood. Pt noted with minimal food and drink intake, decreased ability to mobilize/transfer (uses motorized wheel chair). Pt found with macrocytic anemia and severe protein calorie malnutrition. EKG with QTc 503.    On evaluation this afternoon with [employer] Charles student, pt found awake, alert, oriented to self, location, date and situation, full tray of pureed food at bedside, drinking ginger ale. Pt agreeable to evaluation and declined use of Kenyan . Pt cooperative and attentive throughout, with poorly articulated speech, at times quite difficult to comprehend. He initially reported fatigue and decreased food intake as reasons for coming to the hospital, uncertain if due to decreased appetite, mouth pain, or lack of access to good food, like lasagna. He reported increased need for sleep (at least 10h/night), unintentional weight loss, and weakness (difficulty getting around even with wheelchair) over three months. He was uncertain about cause of changes. He initially denied low mood or recent stressors (other than CVA five years ago and adjustments since), but when asked directly about his wife's health, he became tearful and acknowledged significant sadness about her physical decline after 55 years of happy marriage. He agreed that her illness (with ?foot problems and urinary issues per pt) has caused him significant distress, but had difficulty describing details (and was difficult to understand). He denied feelings of hopelessness about the future, anhedonia (although had difficulty describing enjoyable activities/how spends time), or guilt. He denied ever experiencing SI or thoughts about death, citing his children and grandchildren as motivating factors in life. He denied experience of perceptual disturbances or other psychotic symptoms. He denied subjective cognitive impairment.    Pt was agreeable to have us contact his daughter, whom he reports works "for the [employer]" for collateral information. He was not agreeable to discussion regarding treatment options for depression, such as medications, stating that he does not believe in taking medication. Attempted psychoeducation re: potential additional benefits of appetite stimulation, weight gain, energy improvement, but pt declined further discussion at this time.     PPH - denies prior psychiatric dx, out or inpt care, med trials, or suicidality.    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 Formed football player.    FH - unknown family psychiatric history    Daughter Alberto [phone] contacted by [employer] Oliver student. 74yo man, , originally from [place], with no formal psychiatric history, medical history of HTN and CVA ?5 years ago with residual weakness and dysarthria who was brought to the ED by EMS activated by daughter due to concern for poor oral intake and low energy over several months, with 20-30lb weight loss. Pt admitted to medicine for management of failure to thrive. Psychiatry consulted to assess for potential role of depression in pt's decline.    Per chart review and d/w primary team, pt with no formal history of depression, takes no medications at home. Recent stressor has been his wife's illness, which pt's daughter believes to be a major factor in pt's poor oral intake and suspected low mood. Pt noted with minimal food and drink intake (eats mainly fruits at baseline since CVA), decreased ability to mobilize/transfer (uses motorized wheel chair). Pt found with macrocytic anemia and severe protein calorie malnutrition. EKG with QTc 503. Pt has been intermittently refusing aspects of care, including repeated blood draws. Team has considered use of mirtazapine for mood and appetite.    On evaluation this afternoon with Melody Meier, PA student, pt found awake, alert, oriented to self, location, date and situation, full tray of pureed food at bedside, drinking ginger ale. Pt agreeable to evaluation and declined use of Colombian . Pt cooperative and attentive throughout, with poorly articulated speech, at times quite difficult to comprehend. He initially reported fatigue and decreased food intake as reasons for coming to the hospital, uncertain if due to decreased appetite, mouth pain, or lack of access to good food, like lasagna. He reported increased need for sleep (at least 10h/night), unintentional weight loss, and weakness (difficulty getting around even with wheelchair) over three months. He was uncertain about cause of changes. He initially denied low mood or recent stressors (other than CVA five years ago and adjustments since), but when asked directly about his wife's health, he became tearful and acknowledged significant sadness about her physical decline after 55 years of happy marriage. He agreed that her illness (with ?foot problems and urinary issues per pt) has caused him significant distress, but had difficulty describing details (and was difficult to understand). He denied feelings of hopelessness about the future, anhedonia (although had difficulty describing enjoyable activities/how spends time), or guilt. He denied ever experiencing SI or thoughts about death, citing his children and grandchildren as motivating factors in life. He denied experience of perceptual disturbances or other psychotic symptoms. He denied subjective cognitive impairment.    Pt was agreeable to have us contact his daughter, whom he reports works "for the government" for collateral information. He was not agreeable to discussion regarding treatment options for depression, such as medications, stating that he does not believe in taking medication. Attempted psychoeducation re: potential additional benefits of appetite stimulation, weight gain, energy improvement, but pt declined further discussion at this time.     PPH - denies prior psychiatric dx, out or inpt care, med trials, or suicidality.    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 Formed football player.    FH - unknown family psychiatric history    Daughter Alberto [phone] contacted by [employer] Oliver student - pending collateral.

## 2020-09-09 NOTE — BEHAVIORAL HEALTH ASSESSMENT NOTE - OTHER
stressor of wife's illness motor exam deferred due to COVID precautions stable posture, gait not assessed sadness re: wife. no endorsed hopelessness or SI. No expressed delusions or paranoia impaired - no focal deficits but delayed recall 0/3 items in five minutes limited insight into potential role of depression in recent symptoms

## 2020-09-09 NOTE — BEHAVIORAL HEALTH ASSESSMENT NOTE - DETAILS
denies ever experiencing SI fatigue, decreased appetite vague mouth pain chronic left sided weakness, dysarthria s/p CVA

## 2020-09-09 NOTE — DIETITIAN INITIAL EVALUATION ADULT. - PROBLEM SELECTOR PLAN 1
Patient with a history of 2 months of poor oral intake secondary to mouth discomfort and social stress from wife's health.  Patient with no new focal neurological defects, not hypoglycemic, no evidence of ACS, no active bleeding. Patient does have a macrocytic anemia, and thyroid function not yet determined, BUN to creatine ratio greater than 20:1 along with ketones in the urine suggesting poor oral intake.   -Likely secondary to poor oral intake due to social condition and mouth discomfort  -Encourage PO intake  -Order TSH  -Order B12 and Folate level  -Social Work and PT consult

## 2020-09-09 NOTE — PHYSICAL THERAPY INITIAL EVALUATION ADULT - DISCHARGE DISPOSITION, PT EVAL
home with HPT and reinstated home health aide/family assist vs subacute rehab pending progress with mobility

## 2020-09-09 NOTE — SWALLOW BEDSIDE ASSESSMENT ADULT - COMMENTS
Received awake & alert. Language skills appear intact at the conversational level and Pt is able to make wants/needs known, but speech intelligibility is mod-sev reduced to ~50% in conversation 2/2 residual dysarthria.

## 2020-09-09 NOTE — DIETITIAN INITIAL EVALUATION ADULT. - PROBLEM SELECTOR PLAN 3
Patient with 2 month history of vague mouth discomfort.  Patient not complaining of any mouth pain. On outer inspection no sign of abscess, erythema, edema, or other gross abnormalities.  -Lidocaine swish and spit

## 2020-09-09 NOTE — SWALLOW BEDSIDE ASSESSMENT ADULT - NS SPL SWALLOW CLINIC TRIAL FT
Pt refused add'l trials of puree indicating that he had already eaten (although tray was untouched) & did not like the remaining food.

## 2020-09-09 NOTE — PHYSICAL THERAPY INITIAL EVALUATION ADULT - MANUAL MUSCLE TESTING RESULTS, REHAB EVAL
LLE: hip flexion 2-/5, knee extension 2-/5, knee flexion 2-/5, DF/PF 3-/5; RLE hip flexion 3-/5, knee extension 3+/5, knee flexion 4-/5, DF/PF 4/5; RUE grossly >3+/5, LUE grossly <3/5

## 2020-09-09 NOTE — DIETITIAN INITIAL EVALUATION ADULT. - PROBLEM SELECTOR PLAN 4
CHAIM vs CHAIM on CKD vs CKD. Patient with decreased oral intake for two months. Labs showing a BUN of 92 and creatine of 1.72 suggesting a prerenal origin. Patient euvolemic on exam.   -Begin Lactated Ringer 110cc/hr

## 2020-09-09 NOTE — CHART NOTE - NSCHARTNOTEFT_GEN_A_CORE
Upon Nutritional Assessment by the Registered Dietitian your patient was determined to meet criteria / has evidence of the following diagnosis/diagnoses:          [ ]  Mild Protein Calorie Malnutrition        [ ]  Moderate Protein Calorie Malnutrition        [x ] Severe Protein Calorie Malnutrition        [ ] Unspecified Protein Calorie Malnutrition        [ ] Underweight / BMI <19        [ ] Morbid Obesity / BMI > 40      Findings as based on:  •  Comprehensive nutrition assessment and consultation  •  Calorie counts (nutrient intake analysis)  •  Food acceptance and intake status from observations by staff  •  Follow up  •  Patient education  •  Intervention secondary to interdisciplinary rounds  •   concerns      Treatment:    The following diet has been recommended:Please add 2 Ensure Enlive to diet order.Pending SLP recommendations      PROVIDER Section:     By signing this assessment you are acknowledging and agree with the diagnosis/diagnoses assigned by the Registered Dietitian    Comments:

## 2020-09-09 NOTE — SWALLOW BEDSIDE ASSESSMENT ADULT - SWALLOW EVAL: DIAGNOSIS
Mild-mod oral stage dysphagia in the setting of chronic deficits after CVA & characterized by mod reduced lingual ROM. Pharyngeal swallow appears intact with no overt signs of aspiration

## 2020-09-09 NOTE — PROGRESS NOTE ADULT - PROBLEM SELECTOR PLAN 5
Patient with poor oral intake and recent 20 to 30 lb weight loss and labs revealing a macrocytic anemia. Anemia likely secondary to vitamin deficiency.   -Consider starting B12 and folate after labs F: Lactated ringers 110 cc/hr  E: Repleat K>4 Mg>2  N: Thick Nectar Diet  DVT: Lovenox   C: Full Code Pt has hx of poor PO intake for 2mo. Poor dentition. Missing all teeth. Denies tenderness to palpation in jaw. No erythema, exudates or thrush observed in oral cavity.   -Pureed diet

## 2020-09-09 NOTE — PROGRESS NOTE ADULT - SUBJECTIVE AND OBJECTIVE BOX
***INCOMPLETE***    OVERNIGHT EVENTS:       SUBJECTIVE:        OBJECTIVE:  Vital Signs Last 24 Hrs  T(C): 36.4 (09 Sep 2020 06:11), Max: 37.1 (08 Sep 2020 19:02)  T(F): 97.6 (09 Sep 2020 06:11), Max: 98.8 (08 Sep 2020 19:02)  HR: 84 (09 Sep 2020 06:11) (82 - 104)  BP: 139/84 (09 Sep 2020 06:11) (109/78 - 148/93)  BP(mean): --  RR: 18 (09 Sep 2020 06:11) (18 - 18)  SpO2: 98% (09 Sep 2020 06:11) (96% - 98%)    Physical Exam:      Labs:                        11.6   11.08 )-----------( 246      ( 08 Sep 2020 12:29 )             34.6     09-08    144  |  107  |  79<H>  ----------------------------<  133<H>  4.3   |  19<L>  |  1.26    Ca    8.9      08 Sep 2020 20:04    TPro  6.9  /  Alb  3.5  /  TBili  1.1  /  DBili  x   /  AST  14  /  ALT  7<L>  /  AlkPhos  31<L>  09-08    PT/INR - ( 08 Sep 2020 12:29 )   PT: 13.5 sec;   INR: 1.13          PTT - ( 08 Sep 2020 12:29 )  PTT:27.8 sec  Fingerstick  glucose:     MEDICATIONS  (STANDING):  enoxaparin Injectable 40 milliGRAM(s) SubCutaneous every 24 hours  influenza   Vaccine 0.5 milliLiter(s) IntraMuscular once  lactated ringers. 1000 milliLiter(s) (110 mL/Hr) IV Continuous <Continuous>    MEDICATIONS  (PRN):      Allergies    No Known Allergies    Intolerances        RADIOLOGY & ADDITIONAL TESTS: Reviewed. ***INCOMPLETE***    OVERNIGHT EVENTS:   No acute events overnight. Refuse IV fluids at 3am and refused vitals    SUBJECTIVE:    Patient seen and examined at bedside. No acute complaints. Pt difficult to comprehend due to speech difficulties due to CVA. Denies HA, CP, SOB, abdominal pain, n/v    OBJECTIVE:  Vital Signs Last 24 Hrs  T(C): 36.4 (09 Sep 2020 06:11), Max: 37.1 (08 Sep 2020 19:02)  T(F): 97.6 (09 Sep 2020 06:11), Max: 98.8 (08 Sep 2020 19:02)  HR: 84 (09 Sep 2020 06:11) (82 - 104)  BP: 139/84 (09 Sep 2020 06:11) (109/78 - 148/93)  BP(mean): --  RR: 18 (09 Sep 2020 06:11) (18 - 18)  SpO2: 98% (09 Sep 2020 06:11) (96% - 98%)    Physical Exam:  Gen: NAD, laying in bed, alert, interactive  HEENT: Poor dentition. Missing all teeth. Denies tenderness to palpation in jaw. No erythema, exudates or thrush observed in oral cavity. PERRL, anicteric sclera, no JVD, no thyromegaly  Cardio: +S1/S2, RRR, no murmurs  Resp: CTA b/l, no w/r/r  GI: +BS x4, NT/ND  Ext: no peripheral edema, NROM x4  Vasc: 3+ peripheral pulses  Skin: warm, dry, and intact. no rashes, wounds or scars  Neuro: Residual weakness in LUE/LLE.    Labs:                        11.6   11.08 )-----------( 246      ( 08 Sep 2020 12:29 )             34.6     09-08    144  |  107  |  79<H>  ----------------------------<  133<H>  4.3   |  19<L>  |  1.26    Ca    8.9      08 Sep 2020 20:04    TPro  6.9  /  Alb  3.5  /  TBili  1.1  /  DBili  x   /  AST  14  /  ALT  7<L>  /  AlkPhos  31<L>  09-08    PT/INR - ( 08 Sep 2020 12:29 )   PT: 13.5 sec;   INR: 1.13          PTT - ( 08 Sep 2020 12:29 )  PTT:27.8 sec  Fingerstick  glucose:     MEDICATIONS  (STANDING):  enoxaparin Injectable 40 milliGRAM(s) SubCutaneous every 24 hours  influenza   Vaccine 0.5 milliLiter(s) IntraMuscular once  lactated ringers. 1000 milliLiter(s) (110 mL/Hr) IV Continuous <Continuous>    MEDICATIONS  (PRN):      Allergies    No Known Allergies    Intolerances        RADIOLOGY & ADDITIONAL TESTS: Reviewed. ***INCOMPLETE***    OVERNIGHT EVENTS:   No acute events overnight. Refuse IV fluids at 3am and refused vitals    SUBJECTIVE:    Patient seen and examined at bedside. No acute complaints. Pt difficult to comprehend due to speech difficulties due to CVA. Denies HA, CP, SOB, abdominal pain, n/v    OBJECTIVE:  Vital Signs Last 24 Hrs  T(C): 36.4 (09 Sep 2020 06:11), Max: 37.1 (08 Sep 2020 19:02)  T(F): 97.6 (09 Sep 2020 06:11), Max: 98.8 (08 Sep 2020 19:02)  HR: 84 (09 Sep 2020 06:11) (82 - 104)  BP: 139/84 (09 Sep 2020 06:11) (109/78 - 148/93)  BP(mean): --  RR: 18 (09 Sep 2020 06:11) (18 - 18)  SpO2: 98% (09 Sep 2020 06:11) (96% - 98%)    Physical Exam:  Gen: NAD, laying in bed, alert, interactive  HEENT: Poor dentition. Missing all teeth. Denies tenderness to palpation in jaw. No erythema, exudates or thrush observed in oral cavity. PERRL, anicteric sclera, no JVD, no thyromegaly  Cardio: +S1/S2, RRR, no murmurs  Resp: CTA b/l, no w/r/r  GI: +BS x4, NT/ND  Ext: no peripheral edema, NROM x4  Vasc: 3+ peripheral pulses  Skin: warm, dry, and intact. no rashes, wounds or scars  Neuro: Residual weakness in LUE/LLE. Speech difficulties    Labs:                        11.6   11.08 )-----------( 246      ( 08 Sep 2020 12:29 )             34.6     09-08    144  |  107  |  79<H>  ----------------------------<  133<H>  4.3   |  19<L>  |  1.26    Ca    8.9      08 Sep 2020 20:04    TPro  6.9  /  Alb  3.5  /  TBili  1.1  /  DBili  x   /  AST  14  /  ALT  7<L>  /  AlkPhos  31<L>  09-08    PT/INR - ( 08 Sep 2020 12:29 )   PT: 13.5 sec;   INR: 1.13          PTT - ( 08 Sep 2020 12:29 )  PTT:27.8 sec  Fingerstick  glucose:     MEDICATIONS  (STANDING):  enoxaparin Injectable 40 milliGRAM(s) SubCutaneous every 24 hours  influenza   Vaccine 0.5 milliLiter(s) IntraMuscular once  lactated ringers. 1000 milliLiter(s) (110 mL/Hr) IV Continuous <Continuous>    MEDICATIONS  (PRN):      Allergies    No Known Allergies    Intolerances        RADIOLOGY & ADDITIONAL TESTS: Reviewed. OVERNIGHT EVENTS:   No acute events overnight. Refuse IV fluids at 3am and refused vitals    SUBJECTIVE:    Patient seen and examined at bedside. No acute complaints. Pt difficult to comprehend due to speech difficulties due to CVA. Denies HA, CP, SOB, abdominal pain, n/v    OBJECTIVE:  Vital Signs Last 24 Hrs  T(C): 36.4 (09 Sep 2020 06:11), Max: 37.1 (08 Sep 2020 19:02)  T(F): 97.6 (09 Sep 2020 06:11), Max: 98.8 (08 Sep 2020 19:02)  HR: 84 (09 Sep 2020 06:11) (82 - 104)  BP: 139/84 (09 Sep 2020 06:11) (109/78 - 148/93)  BP(mean): --  RR: 18 (09 Sep 2020 06:11) (18 - 18)  SpO2: 98% (09 Sep 2020 06:11) (96% - 98%)    Physical Exam:  Gen: NAD, laying in bed, alert, interactive  HEENT: Poor dentition. Missing all teeth. Denies tenderness to palpation in jaw. No erythema, exudates or thrush observed in oral cavity. PERRL, anicteric sclera, no JVD, no thyromegaly  Cardio: +S1/S2, RRR, no murmurs  Resp: CTA b/l, no w/r/r  GI: +BS x4, NT/ND  Ext: no peripheral edema, NROM x4  Vasc: 3+ peripheral pulses  Skin: warm, dry, and intact. no rashes, wounds or scars  Neuro: Residual weakness in LUE/LLE. Speech difficulties    Labs:                        11.6   11.08 )-----------( 246      ( 08 Sep 2020 12:29 )             34.6     09-08    144  |  107  |  79<H>  ----------------------------<  133<H>  4.3   |  19<L>  |  1.26    Ca    8.9      08 Sep 2020 20:04    TPro  6.9  /  Alb  3.5  /  TBili  1.1  /  DBili  x   /  AST  14  /  ALT  7<L>  /  AlkPhos  31<L>  09-08    PT/INR - ( 08 Sep 2020 12:29 )   PT: 13.5 sec;   INR: 1.13          PTT - ( 08 Sep 2020 12:29 )  PTT:27.8 sec  Fingerstick  glucose:     MEDICATIONS  (STANDING):  enoxaparin Injectable 40 milliGRAM(s) SubCutaneous every 24 hours  influenza   Vaccine 0.5 milliLiter(s) IntraMuscular once  lactated ringers. 1000 milliLiter(s) (110 mL/Hr) IV Continuous <Continuous>    MEDICATIONS  (PRN):      Allergies    No Known Allergies    Intolerances        RADIOLOGY & ADDITIONAL TESTS: Reviewed.

## 2020-09-09 NOTE — BEHAVIORAL HEALTH ASSESSMENT NOTE - NSBHCONSULTFOLLOWAFTERCARE_PSY_A_CORE FT
If pt discharged to subacute rehab, recommend continued psychiatric f/u at rehab for assessment of mood and medication needs.    Recommend outpatient mental health referral to   [facility]  [facility]  Geared towards an aging population, with the understanding that their clients will need increasingly comprehensive services.  Must be over 55.    [address]  [address]  [phone]    can also provide information for hotline (730)[facility]   Formerly (954)[employer], a 24 hour mental health hotline that can identify in network clinics, support groups, and can offer emergency assistance

## 2020-09-10 ENCOUNTER — TRANSCRIPTION ENCOUNTER (OUTPATIENT)
Age: 75
End: 2020-09-10

## 2020-09-10 DIAGNOSIS — N17.9 ACUTE KIDNEY FAILURE, UNSPECIFIED: ICD-10-CM

## 2020-09-10 DIAGNOSIS — D64.9 ANEMIA, UNSPECIFIED: ICD-10-CM

## 2020-09-10 DIAGNOSIS — K92.2 GASTROINTESTINAL HEMORRHAGE, UNSPECIFIED: ICD-10-CM

## 2020-09-10 LAB
ALBUMIN SERPL ELPH-MCNC: 2.8 G/DL — LOW (ref 3.3–5)
ALBUMIN SERPL ELPH-MCNC: 2.9 G/DL — LOW (ref 3.3–5)
ALP SERPL-CCNC: 24 U/L — LOW (ref 40–120)
ALP SERPL-CCNC: 25 U/L — LOW (ref 40–120)
ALT FLD-CCNC: 6 U/L — LOW (ref 10–45)
ALT FLD-CCNC: 6 U/L — LOW (ref 10–45)
ANION GAP SERPL CALC-SCNC: 12 MMOL/L — SIGNIFICANT CHANGE UP (ref 5–17)
ANION GAP SERPL CALC-SCNC: 13 MMOL/L — SIGNIFICANT CHANGE UP (ref 5–17)
AST SERPL-CCNC: 10 U/L — SIGNIFICANT CHANGE UP (ref 10–40)
AST SERPL-CCNC: 11 U/L — SIGNIFICANT CHANGE UP (ref 10–40)
BASOPHILS # BLD AUTO: 0.04 K/UL — SIGNIFICANT CHANGE UP (ref 0–0.2)
BASOPHILS # BLD AUTO: 0.05 K/UL — SIGNIFICANT CHANGE UP (ref 0–0.2)
BASOPHILS NFR BLD AUTO: 0.4 % — SIGNIFICANT CHANGE UP (ref 0–2)
BASOPHILS NFR BLD AUTO: 0.5 % — SIGNIFICANT CHANGE UP (ref 0–2)
BILIRUB SERPL-MCNC: 0.5 MG/DL — SIGNIFICANT CHANGE UP (ref 0.2–1.2)
BILIRUB SERPL-MCNC: 0.6 MG/DL — SIGNIFICANT CHANGE UP (ref 0.2–1.2)
BLD GP AB SCN SERPL QL: NEGATIVE — SIGNIFICANT CHANGE UP
BUN SERPL-MCNC: 74 MG/DL — HIGH (ref 7–23)
BUN SERPL-MCNC: 75 MG/DL — HIGH (ref 7–23)
CALCIUM SERPL-MCNC: 8.4 MG/DL — SIGNIFICANT CHANGE UP (ref 8.4–10.5)
CALCIUM SERPL-MCNC: 8.7 MG/DL — SIGNIFICANT CHANGE UP (ref 8.4–10.5)
CHLORIDE SERPL-SCNC: 114 MMOL/L — HIGH (ref 96–108)
CHLORIDE SERPL-SCNC: 120 MMOL/L — HIGH (ref 96–108)
CO2 SERPL-SCNC: 21 MMOL/L — LOW (ref 22–31)
CO2 SERPL-SCNC: 22 MMOL/L — SIGNIFICANT CHANGE UP (ref 22–31)
CREAT SERPL-MCNC: 0.98 MG/DL — SIGNIFICANT CHANGE UP (ref 0.5–1.3)
CREAT SERPL-MCNC: 0.98 MG/DL — SIGNIFICANT CHANGE UP (ref 0.5–1.3)
EOSINOPHIL # BLD AUTO: 0.01 K/UL — SIGNIFICANT CHANGE UP (ref 0–0.5)
EOSINOPHIL # BLD AUTO: 0.01 K/UL — SIGNIFICANT CHANGE UP (ref 0–0.5)
EOSINOPHIL NFR BLD AUTO: 0.1 % — SIGNIFICANT CHANGE UP (ref 0–6)
EOSINOPHIL NFR BLD AUTO: 0.1 % — SIGNIFICANT CHANGE UP (ref 0–6)
FOLATE SERPL-MCNC: 4 NG/ML — LOW
GLUCOSE SERPL-MCNC: 130 MG/DL — HIGH (ref 70–99)
GLUCOSE SERPL-MCNC: 161 MG/DL — HIGH (ref 70–99)
HCT VFR BLD CALC: 20.5 % — CRITICAL LOW (ref 39–50)
HCT VFR BLD CALC: 21.6 % — LOW (ref 39–50)
HCT VFR BLD CALC: 22.7 % — LOW (ref 39–50)
HGB BLD-MCNC: 6.6 G/DL — CRITICAL LOW (ref 13–17)
HGB BLD-MCNC: 7.1 G/DL — LOW (ref 13–17)
HGB BLD-MCNC: 7.4 G/DL — LOW (ref 13–17)
IMM GRANULOCYTES NFR BLD AUTO: 0.9 % — SIGNIFICANT CHANGE UP (ref 0–1.5)
IMM GRANULOCYTES NFR BLD AUTO: 1 % — SIGNIFICANT CHANGE UP (ref 0–1.5)
LACTATE SERPL-SCNC: 1.5 MMOL/L — SIGNIFICANT CHANGE UP (ref 0.5–2)
LACTATE SERPL-SCNC: 1.6 MMOL/L — SIGNIFICANT CHANGE UP (ref 0.5–2)
LYMPHOCYTES # BLD AUTO: 2.14 K/UL — SIGNIFICANT CHANGE UP (ref 1–3.3)
LYMPHOCYTES # BLD AUTO: 2.15 K/UL — SIGNIFICANT CHANGE UP (ref 1–3.3)
LYMPHOCYTES # BLD AUTO: 22.3 % — SIGNIFICANT CHANGE UP (ref 13–44)
LYMPHOCYTES # BLD AUTO: 22.5 % — SIGNIFICANT CHANGE UP (ref 13–44)
MAGNESIUM SERPL-MCNC: 2 MG/DL — SIGNIFICANT CHANGE UP (ref 1.6–2.6)
MCHC RBC-ENTMCNC: 32.2 GM/DL — SIGNIFICANT CHANGE UP (ref 32–36)
MCHC RBC-ENTMCNC: 32.6 GM/DL — SIGNIFICANT CHANGE UP (ref 32–36)
MCHC RBC-ENTMCNC: 32.9 GM/DL — SIGNIFICANT CHANGE UP (ref 32–36)
MCHC RBC-ENTMCNC: 36.3 PG — HIGH (ref 27–34)
MCHC RBC-ENTMCNC: 36.6 PG — HIGH (ref 27–34)
MCHC RBC-ENTMCNC: 36.6 PG — HIGH (ref 27–34)
MCV RBC AUTO: 111.3 FL — HIGH (ref 80–100)
MCV RBC AUTO: 112.4 FL — HIGH (ref 80–100)
MCV RBC AUTO: 112.6 FL — HIGH (ref 80–100)
MONOCYTES # BLD AUTO: 0.68 K/UL — SIGNIFICANT CHANGE UP (ref 0–0.9)
MONOCYTES # BLD AUTO: 0.7 K/UL — SIGNIFICANT CHANGE UP (ref 0–0.9)
MONOCYTES NFR BLD AUTO: 7.1 % — SIGNIFICANT CHANGE UP (ref 2–14)
MONOCYTES NFR BLD AUTO: 7.3 % — SIGNIFICANT CHANGE UP (ref 2–14)
NEUTROPHILS # BLD AUTO: 6.55 K/UL — SIGNIFICANT CHANGE UP (ref 1.8–7.4)
NEUTROPHILS # BLD AUTO: 6.63 K/UL — SIGNIFICANT CHANGE UP (ref 1.8–7.4)
NEUTROPHILS NFR BLD AUTO: 68.9 % — SIGNIFICANT CHANGE UP (ref 43–77)
NEUTROPHILS NFR BLD AUTO: 68.9 % — SIGNIFICANT CHANGE UP (ref 43–77)
NRBC # BLD: 0 /100 WBCS — SIGNIFICANT CHANGE UP (ref 0–0)
PHOSPHATE SERPL-MCNC: 2.8 MG/DL — SIGNIFICANT CHANGE UP (ref 2.5–4.5)
PLATELET # BLD AUTO: 217 K/UL — SIGNIFICANT CHANGE UP (ref 150–400)
PLATELET # BLD AUTO: 220 K/UL — SIGNIFICANT CHANGE UP (ref 150–400)
PLATELET # BLD AUTO: 237 K/UL — SIGNIFICANT CHANGE UP (ref 150–400)
POTASSIUM SERPL-MCNC: 3 MMOL/L — LOW (ref 3.5–5.3)
POTASSIUM SERPL-MCNC: 3.2 MMOL/L — LOW (ref 3.5–5.3)
POTASSIUM SERPL-SCNC: 3 MMOL/L — LOW (ref 3.5–5.3)
POTASSIUM SERPL-SCNC: 3.2 MMOL/L — LOW (ref 3.5–5.3)
PROT SERPL-MCNC: 5.2 G/DL — LOW (ref 6–8.3)
PROT SERPL-MCNC: 5.6 G/DL — LOW (ref 6–8.3)
RBC # BLD: 1.82 M/UL — LOW (ref 4.2–5.8)
RBC # BLD: 1.94 M/UL — LOW (ref 4.2–5.8)
RBC # BLD: 2.02 M/UL — LOW (ref 4.2–5.8)
RBC # FLD: 15 % — HIGH (ref 10.3–14.5)
RBC # FLD: 15.1 % — HIGH (ref 10.3–14.5)
RBC # FLD: 15.7 % — HIGH (ref 10.3–14.5)
RH IG SCN BLD-IMP: POSITIVE — SIGNIFICANT CHANGE UP
SODIUM SERPL-SCNC: 149 MMOL/L — HIGH (ref 135–145)
SODIUM SERPL-SCNC: 153 MMOL/L — HIGH (ref 135–145)
TSH SERPL-MCNC: 2.05 UIU/ML — SIGNIFICANT CHANGE UP (ref 0.35–4.94)
VIT B12 SERPL-MCNC: >2000 PG/ML — HIGH (ref 232–1245)
WBC # BLD: 9.16 K/UL — SIGNIFICANT CHANGE UP (ref 3.8–10.5)
WBC # BLD: 9.52 K/UL — SIGNIFICANT CHANGE UP (ref 3.8–10.5)
WBC # BLD: 9.63 K/UL — SIGNIFICANT CHANGE UP (ref 3.8–10.5)
WBC # FLD AUTO: 9.16 K/UL — SIGNIFICANT CHANGE UP (ref 3.8–10.5)
WBC # FLD AUTO: 9.52 K/UL — SIGNIFICANT CHANGE UP (ref 3.8–10.5)
WBC # FLD AUTO: 9.63 K/UL — SIGNIFICANT CHANGE UP (ref 3.8–10.5)

## 2020-09-10 PROCEDURE — 99233 SBSQ HOSP IP/OBS HIGH 50: CPT | Mod: GC

## 2020-09-10 PROCEDURE — 99233 SBSQ HOSP IP/OBS HIGH 50: CPT

## 2020-09-10 PROCEDURE — 99223 1ST HOSP IP/OBS HIGH 75: CPT

## 2020-09-10 RX ORDER — PREGABALIN 225 MG/1
1 CAPSULE ORAL
Qty: 30 | Refills: 0
Start: 2020-09-10 | End: 2020-10-09

## 2020-09-10 RX ORDER — PANTOPRAZOLE SODIUM 20 MG/1
40 TABLET, DELAYED RELEASE ORAL
Refills: 0 | Status: DISCONTINUED | OUTPATIENT
Start: 2020-09-10 | End: 2020-09-10

## 2020-09-10 RX ORDER — POTASSIUM CHLORIDE 20 MEQ
40 PACKET (EA) ORAL
Refills: 0 | Status: COMPLETED | OUTPATIENT
Start: 2020-09-10 | End: 2020-09-10

## 2020-09-10 RX ORDER — PANTOPRAZOLE SODIUM 20 MG/1
8 TABLET, DELAYED RELEASE ORAL
Qty: 80 | Refills: 0 | Status: DISCONTINUED | OUTPATIENT
Start: 2020-09-10 | End: 2020-09-11

## 2020-09-10 RX ORDER — MIRTAZAPINE 45 MG/1
1 TABLET, ORALLY DISINTEGRATING ORAL
Qty: 30 | Refills: 0
Start: 2020-09-10 | End: 2020-10-09

## 2020-09-10 RX ORDER — SODIUM CHLORIDE 9 MG/ML
1000 INJECTION, SOLUTION INTRAVENOUS ONCE
Refills: 0 | Status: COMPLETED | OUTPATIENT
Start: 2020-09-10 | End: 2020-09-10

## 2020-09-10 RX ORDER — POTASSIUM CHLORIDE 20 MEQ
40 PACKET (EA) ORAL ONCE
Refills: 0 | Status: COMPLETED | OUTPATIENT
Start: 2020-09-10 | End: 2020-09-10

## 2020-09-10 RX ADMIN — Medication 40 MILLIEQUIVALENT(S): at 19:41

## 2020-09-10 RX ADMIN — MIRTAZAPINE 7.5 MILLIGRAM(S): 45 TABLET, ORALLY DISINTEGRATING ORAL at 21:37

## 2020-09-10 RX ADMIN — PREGABALIN 1000 MICROGRAM(S): 225 CAPSULE ORAL at 11:28

## 2020-09-10 RX ADMIN — PANTOPRAZOLE SODIUM 10 MG/HR: 20 TABLET, DELAYED RELEASE ORAL at 16:19

## 2020-09-10 RX ADMIN — SODIUM CHLORIDE 1000 MILLILITER(S): 9 INJECTION, SOLUTION INTRAVENOUS at 15:43

## 2020-09-10 RX ADMIN — Medication 40 MILLIEQUIVALENT(S): at 16:47

## 2020-09-10 NOTE — CONSULT NOTE ADULT - ATTENDING COMMENTS
Pt with anemia and melena.  Looks comfortable, stable.  Plan for EGD tomorrow morning as first case.  Continue PPI bid.

## 2020-09-10 NOTE — PROGRESS NOTE ADULT - PROBLEM SELECTOR PLAN 6
Cr 1.72 on admission, now improved to 1.26 s/p IV. Likely pre-renal 2/2 hypovolemia from poor PO intake.   -continue to monitor BMP

## 2020-09-10 NOTE — PROGRESS NOTE BEHAVIORAL HEALTH - RISK ASSESSMENT
unchanged - absence of any current or past SI, future-oriented, multiple protective factors including family support and connection, help-seeking behaviors, residential and financial stability. +risk factor of current mood episode with risk mitigated by care in hospital, treatment of nutritional status, initiation of medication, referral to outpatient mental health care

## 2020-09-10 NOTE — PROGRESS NOTE ADULT - SUBJECTIVE AND OBJECTIVE BOX
Physical Medicine and Rehabilitation Progress Note:    Patient is a 75y old  Male who presents with a chief complaint of Weakness (10 Sep 2020 15:14)      HPI:  The interview for this hospital course was done with the patient along with his daughter who was at bed side.  The patient is a 75 year old  male with a past medical history of CVA (5 years ago currently not on any medication with residual weakness of the left lower leg and left upper extremity limiting him to a mobility scooter) and hypertension who arrived to the hospital with a chief complaint of weakness.  The patient stated that his symptoms began approximately two months ago.  He attributes the cause of his current state due to a general mouth discomfort and concern over his wife's health.  The discomfort is located along the lower left gum line, that limits his ability to chew. The discomfort is not made better or worse with anything, spread to any other location, and is with him throughout the day and night. He states he uses a juicer to blend his meals prior to consuming them.  Over the past two months the patient has had 20 to 30 pound weight loss that he attributes to not eating.  He denies any recent mouth trauma, swelling, erythema, dysphagia, fever, night sweats, chills.    The patient stated he has had concern of his wife's health.  The wife recently had a genitourinary procedure and now has a chronic Fabian which has caused him some distress.  The patient states he feels tired more than normal and has lower energy levels. He states that the COVID environment has made him less happy, but he denies feeling depressed. He denies a loss of interest in activities, feelings of guilt/worthlessness, changes in sleep pattern, concentration, or suicidal/homicidal thoughts.       Patient denying any changes in personality, headache, changes in vision, changes in memory, chest pain, palpitations, arrythmia, sob, cough, sputum production, chest tightness, wheezing, abdominal pain, diarrhea, constipation, dysphagia, no prior history of colonoscopy, polyuria, hematuria, hematochezia, joint pain, peripheral edema, changes in skin moles or pigment.      ED Course:   Vitals: Afebrile, HR 93, /82, SpO2 97% on RA  Labs: WBC 11.08, Hgb 11.6, Hct 34.6, .8, Platelet 246, BUN: 92, Cr 1.72, Glucose of 169, Urine Ketones 15.   Imaging: CXR: No acute pathology   EKG: Sinus tachycardia heart rate 102, no ST segment changes (08 Sep 2020 17:39)                            7.4    9.52  )-----------( 237      ( 10 Sep 2020 11:32 )             22.7       09-10    149<H>  |  114<H>  |  75<H>  ----------------------------<  161<H>  3.0<L>   |  22  |  0.98    Ca    8.7      10 Sep 2020 10:29  Phos  2.8     09-10  Mg     2.0     09-10    TPro  5.6<L>  /  Alb  2.9<L>  /  TBili  0.5  /  DBili  x   /  AST  10  /  ALT  6<L>  /  AlkPhos  25<L>  09-10    Vital Signs Last 24 Hrs  T(C): 36.7 (10 Sep 2020 11:44), Max: 36.7 (10 Sep 2020 05:53)  T(F): 98.1 (10 Sep 2020 11:44), Max: 98.1 (10 Sep 2020 05:53)  HR: 102 (10 Sep 2020 11:44) (98 - 102)  BP: 97/62 (10 Sep 2020 11:44) (97/62 - 109/76)  BP(mean): --  RR: 20 (10 Sep 2020 11:44) (18 - 20)  SpO2: 98% (10 Sep 2020 11:44) (97% - 98%)    MEDICATIONS  (STANDING):  cyanocobalamin 1000 MICROGram(s) Oral daily  influenza   Vaccine 0.5 milliLiter(s) IntraMuscular once  mirtazapine 7.5 milliGRAM(s) Oral at bedtime  pantoprazole Infusion 8 mG/Hr (10 mL/Hr) IV Continuous <Continuous>  potassium chloride   Solution 40 milliEquivalent(s) Oral once    MEDICATIONS  (PRN):    Currently Undergoing Physical/ Occupational Therapy at bedside.    Functional Status Assessment:   9/9/2020    Previous Level of Function:     · Ambulation Skills	unable to perform	  · Transfer Skills	needs device and assist	  · ADL Skills	needs device and assist	  · Work/Leisure Activity	needs device and assist	  · Additional Comments	pt reports he does not ambulate at baseline 2/2 prior CVA, uses motorized scooter at baseline which he can typically transfer in/out of with little to no assistance; has home aide 12 hrs daily.	    Cognitive Status Examination:   · Orientation	oriented to person, place, time and situation	  · Level of Consciousness	alert	  · Follows Commands and Answers Questions	100% of the time	  · Personal Safety and Judgment	intact	  · Short Term Memory	intact	  · Long Term Memory	intact	    Range of Motion Exam:   · Range of Motion Examination	no ROM deficits were identified	    Manual Muscle Testing:   · Manual Muscle Testing Results	LLE: hip flexion 2-/5, knee extension 2-/5, knee flexion 2-/5, DF/PF 3-/5; RLE hip flexion 3-/5, knee extension 3+/5, knee flexion 4-/5, DF/PF 4/5; RUE grossly >3+/5, LUE grossly <3/5	    Muscle Tone Assessment:   · Muscle Tone Assessment	left-side extremities; hypotonic	    Bed Mobility: Rolling/Turning:     · Level of [place]	minimum assist (75% patients effort)	  · Physical Assist/Nonphysical Assist	verbal cues; nonverbal cues (demo/gestures); 1 person assist	  · Assistive Device	bed rails	    Bed Mobility: Scooting/Bridging:     · Level of [place]	minimum assist (75% patients effort); moderate assist (50% patients effort)	  · Physical Assist/Nonphysical Assist	verbal cues; nonverbal cues (demo/gestures); 1 person assist	  · Assistive Device	bed rails	    Bed Mobility: Sit to Supine:     · Level of [place]	moderate assist (50% patients effort)	  · Physical Assist/Nonphysical Assist	verbal cues; nonverbal cues (demo/gestures); 1 person assist	  · Assistive Device	bed rails	    Bed Mobility: Supine to Sit:     · Level of [place]	moderate assist (50% patients effort)	  · Physical Assist/Nonphysical Assist	nonverbal cues (demo/gestures); verbal cues; 1 person assist	  · Assistive Device	bed rails	    Bed Mobility Analysis:     · Bed Mobility Limitations	decreased ability to use arms for pushing/pulling; decreased ability to use legs for bridging/pushing; impaired ability to control trunk for mobility	  · Impairments Contributing to Impaired Bed Mobility	impaired balance; impaired coordination; impaired motor control; abnormal muscle tone; impaired postural control; decreased strength	    Transfer: Sit to Stand:     · Level of [place]	moderate assist (50% patients effort); maximum assist (25% patients effort)	  · Physical Assist/Nonphysical Assist	verbal cues; nonverbal cues (demo/gestures); 1 person assist	  · Weight-Bearing Restrictions	weight-bearing as tolerated	  · Assistive Device	UE/trunk support	    Transfer: Stand to Sit:     · Level of [place]	moderate assist (50% patients effort)	  · Physical Assist/Nonphysical Assist	verbal cues; nonverbal cues (demo/gestures); 1 person assist	  · Weight-Bearing Restrictions	weight-bearing as tolerated	    Sit/Stand Transfer Safety Analysis:     · Transfer Safety Concerns Noted	decreased balance during turns; decreased safety awareness; losing balance; decreased sequencing ability; decreased step length; decreased weight-shifting ability	  · Impairments Contributing to Impaired Transfers	impaired balance; impaired coordination; impaired motor control; abnormal muscle tone; impaired postural control; decreased strength	    Gait Skills:     · Level of [place]	unable to perform	    Balance Skills Assessment:     · Sitting Balance: Static	fair balance	  · Sitting Balance: Dynamic	fair minus	  · Sit-to-Stand Balance	poor balance	  · Standing Balance: Static	poor balance	  · Standing Balance: Dynamic	poor balance	  · Identified Impairments Contributing to Balance Disturbance	impaired postural control; impaired coordination; impaired motor control; abnormal muscle tone; decreased strength	    Sensory Examination:   Sensory Examination:    Grossly Intact:   · Gross Sensory Examination	Grossly Intact	      Clinical Impressions:   · Criteria for Skilled Therapeutic Interventions	impairments found; functional limitations in following categories; anticipated equipment needs at discharge; anticipated discharge recommendation; risk reduction/prevention; rehab potential; therapy frequency; predicted duration of therapy intervention	  · Impairments Found (describe specific impairments)	aerobic capacity/endurance; muscle strength; gross motor; posture; poor safety awareness; gait, locomotion, and balance	  · Functional Limitations in Following Categories (describe specific limitations)	self-care; home management; community/leisure	  · Risk Reduction/Prevention (Describe Specific Areas of risk reduction/prevention)	risk factors	  · Risk Areas	fall	  · Rehab Potential	good, to achieve stated therapy goals	  · Therapy Frequency	2-3x/week	        PM&R Impression: as above    Current Disposition Plan Recommendations:    subacute rehab placement

## 2020-09-10 NOTE — PROGRESS NOTE ADULT - PROBLEM SELECTOR PLAN 5
Pt has hx of poor PO intake for 2mo. Poor dentition. Missing all teeth. Denies tenderness to palpation in jaw. No erythema, exudates or thrush observed in oral cavity.   -Pureed diet

## 2020-09-10 NOTE — PROGRESS NOTE ADULT - PROBLEM SELECTOR PLAN 2
Hgb 11.6 and  on admission. Likely 2/2 GI bleed, history of anemia, and B12 deficiency due to poor PO intake. Daughter states pt takes Vit B12 1000mcg qd at home, but noncompliant.   -c/w vit B12 1000mcg qd  -see plan GI bleed

## 2020-09-10 NOTE — PROGRESS NOTE ADULT - PROBLEM SELECTOR PLAN 3
Pt presented to hospital with weakness. Likely 2/2 combination of GIB, chronic anemia, and poor PO intake  -see plan for GI Bleed  -see plan for Anemia Pt lost 20-30lbs over the past 2mo.  Pt experiencing significant stress due to wife's health, as well as physical limitations from CVA.  -c/w Mirtazapine 7.5mg PO qd at bedtime Pt has loss of appetite, eating/drinking less than usual, lost 20-30 over past 2 months, and is less active than normal. Likely 2/2 stress of wife's health as well as physical limitations from CVA.  -Psych consulted, appreciate recs  -c/w mirtazapine 7.5mg qd PO at bedtime

## 2020-09-10 NOTE — PROGRESS NOTE ADULT - ASSESSMENT
76 yo M w/ a PMHx of CVA (weakness LUE/LLE with mobility limited to scooter) and HTN admitted for failure to thrive, likely due to poor PO intake. Found to have macrocytic anemia, AG metabolic acidosis, and CHAIM.

## 2020-09-10 NOTE — PROGRESS NOTE ADULT - PROBLEM SELECTOR PLAN 1
Pt lost 20-30lbs over the past 2mo. Likely 2/2 poor PO intake from mouth discomfort and stress from wife's health.   -f/u speech and swallow  -f/u nutrition consult  -f/u TSH  -f/u PT consult  -f/u social work

## 2020-09-10 NOTE — CONSULT NOTE ADULT - SUBJECTIVE AND OBJECTIVE BOX
HPI:  75 year old  male with a past medical history of CVA with residual weakness of the left lower leg and left upper extremity limiting him to a mobility scooter) and hypertension who arrived to the hospital with a chief complaint of weakness.  The patient stated that his symptoms began approximately two months ago.  He attributes the cause of his current state due to a general mouth/gum discomfort and concern over his wife's health.  The discomfort is located along the lower left gum line, that limits his ability to chew. The discomfort is not made better or worse with anything, spread to any other location, and is with him throughout the day and night. He states he uses a juicer to blend his meals prior to consuming them.  Over the past two months the patient has had 20 to 30 pound weight loss that he attributes to not eating.  He denies any recent mouth trauma, swelling, erythema, dysphagia, fever, night sweats, chills.    The patient stated he has had concern of his wife's health.  The wife recently had a genitourinary procedure and now has a chronic Fabian which has caused him some distress.  The patient states he feels tired more than normal and has lower energy levels. He states that the COVID environment has made him less happy.    Plan was for discharge today but pt had episode of melena with subsequent drop in hb, VSS remained stable.    Patient denies any n/v/fever/chills. Has mild epigastric pain.     Never had an EGD or colonoscopy       ED Course:   Vitals: Afebrile, HR 93, /82, SpO2 97% on RA  Labs: WBC 11.08, Hgb 11.6, Hct 34.6, .8, Platelet 246, BUN: 92, Cr 1.72, Glucose of 169, Urine Ketones 15.   Imaging: CXR: No acute pathology   EKG: Sinus tachycardia heart rate 102, no ST segment changes (08 Sep 2020 17:39)    Allergies    No Known Allergies    Intolerances      Home Medications:    MEDICATIONS:  MEDICATIONS  (STANDING):  cyanocobalamin 1000 MICROGram(s) Oral daily  influenza   Vaccine 0.5 milliLiter(s) IntraMuscular once  mirtazapine 7.5 milliGRAM(s) Oral at bedtime  pantoprazole Infusion 8 mG/Hr (10 mL/Hr) IV Continuous <Continuous>    MEDICATIONS  (PRN):    PAST MEDICAL & SURGICAL HISTORY:  Cerebrovascular accident (CVA)  HTN (hypertension)  No significant past surgical history    FAMILY HISTORY:  No pertinent family history in first degree relatives    SOCIAL HISTORY:  [place]: [ ] Current, [ ] Former, [ ] Never; Pack Years:  Alcohol:  Illicit Drugs:    REVIEW OF SYSTEMS:  CONSTITUTIONAL: No weakness, fevers or chills  HEENT: No visual changes; No vertigo or throat pain   NECK: No pain or stiffness  RESPIRATORY: No cough, wheezing, hemoptysis; No shortness of breath  CARDIOVASCULAR: No chest pain or palpitations  GASTROINTESTINAL: No nausea, vomiting, or hematemesis  GENITOURINARY: No dysuria, frequency or hematuria  NEUROLOGICAL: No numbness or weakness  SKIN: No itching, burning, rashes, or lesions   All other 10 review of systems is negative unless indicated above.    Vital Signs Last 24 Hrs  T(C): 36.6 (10 Sep 2020 17:02), Max: 36.7 (10 Sep 2020 05:53)  T(F): 97.9 (10 Sep 2020 17:02), Max: 98.1 (10 Sep 2020 05:53)  HR: 106 (10 Sep 2020 17:02) (98 - 106)  BP: 108/62 (10 Sep 2020 17:02) (97/62 - 109/76)  BP(mean): --  RR: 20 (10 Sep 2020 17:02) (18 - 20)  SpO2: 98% (10 Sep 2020 17:02) (97% - 98%)      PHYSICAL EXAM:    General: Well developed; well nourished; in no acute distress  Eyes: Anicteric sclerae, moist conjunctivae  HENT: Moist mucous membranes  Neck: Trachea midline, supple  Lungs: Normal respiratory effors and no intercostal retractions  Cardiovascular: RRR  Abdomen: Soft, non-tender non-distended; Normal bowel sounds; No rebound or guarding  Extremities:  No cyanosis or edema  Neurological: Alert and oriented x3  Skin: Warm and dry. No obvious rash    LABS:                        7.4    9.52  )-----------( 237      ( 10 Sep 2020 11:32 )             22.7     09-10    149<H>  |  114<H>  |  75<H>  ----------------------------<  161<H>  3.0<L>   |  22  |  0.98    Ca    8.7      10 Sep 2020 10:29  Phos  2.8     09-10  Mg     2.0     09-10    TPro  5.6<L>  /  Alb  2.9<L>  /  TBili  0.5  /  DBili  x   /  AST  10  /  ALT  6<L>  /  AlkPhos  25<L>  09-10            Culture - Urine (collected 08 Sep 2020 16:25)  Source: .Urine Clean Catch (Midstream)  Final Report (09 Sep 2020 14:26):    Less than 10,000 cols/cc; Insignificant amount of growth.      RADIOLOGY & ADDITIONAL STUDIES:

## 2020-09-10 NOTE — DISCHARGE NOTE PROVIDER - PROVIDER TOKENS
FREE:[LAST:[Santino],FIRST:[Dr. Johnson],PHONE:[phone]],FAX:[(   )    -],SCHEDULEDAPPT:[09/16/2020],SCHEDULEDAPPTTIME:[11:00 AM]] FREE:[LAST:[Santino],FIRST:[Dr. Johnson],PHONE:[phone]],FAX:[(   )    -],SCHEDULEDAPPT:[09/16/2020],SCHEDULEDAPPTTIME:[11:00 AM]],PROVIDER:[TOKEN:[[id number]],SCHEDULEDAPPT:[09/30/2020],SCHEDULEDAPPTTIME:[11:30 AM]]

## 2020-09-10 NOTE — PROGRESS NOTE BEHAVIORAL HEALTH - SUMMARY
74yo man, , originally from [place], with no formal psychiatric history, medical history of HTN and CVA ~5 years ago with residual weakness and dysarthria who presents with depressive symptoms, functional decline, and mild cognitive impairment (short term memory deficits) in setting of recent poor oral intake and stressor of wife's illness. Evaluation somewhat limited due to pt's dysarthria, but it appears that depression (low mood related to his wife's decline) may be contributing to pt's decreased oral intake and low energy over the last several months. It is also possible that nutritional deficiencies, thyroid function, or other medical factors (?unclear baseline s/p CVA) may be contributing to depressive symptoms and cognitive decline - remainder of workup still pending. Collateral from pt's daughter is consistent with potential role of depression/adjustment symptoms in pt's illness and is reassuring re: strong social support and absence of acute safety concerns such as suicidality.    At this time, recommend continued trial of mirtazapine to target depressive symptoms and appetite, with emphasis to pt on appetite-stimulating properties to help build alliance. Mirtazapine is unlikely to significantly contribute to QTc prolongation (noted on admission), though would monitor QTc with initiation given risk of arrhythmia, particularly if other risk factors such as electrolyte imbalances present.     No evidence of any acute suicidality or other safety concerns on assessment that would warrant involuntary inpatient psychiatric care. Pt appears future-oriented and motivated for treatment, though with limited psychological awareness. Recommend referral to outpatient mental health treatment (education also provided to daughter); pt not receptive to further discussion of mental health treatment options at this time.     DDX:   Unspecified depression, consider MDD, single episode, moderate, depression secondary to a general medical condition, adjustment d/o  Unspecified neurocognitive impairment      RECOMMENDATIONS  -no need for sitter or indication for psychiatric admission  -agree with medical w/u for potential contributors to mood/cognitive decline - f/u TSH, B12, folate  -Continue trial of mirtazapine 7.5mg QHS for depressive symptoms and appetite stimulation. Can be further titrated as outpatient depending on tolerability and clinical response  -please recheck EKG for QTc prolongation. Mirtazapine unlikely to have significant impact on QTc but should be monitored with initiation. Would also check and maintain K and Mg at high-normal levels.  -encourage attention to pt's care needs given difficulty with verbal communication  -collateral obtained from pt's daughter and care plan discussed  -recommend providing referral for outpatient psychiatric care, though pt reluctant to consider at this time  -no psychiatric barrier to discharge. Please contact with questions

## 2020-09-10 NOTE — PROGRESS NOTE BEHAVIORAL HEALTH - NSBHFUPINTERVALHXFT_PSY_A_CORE
74yo man, , originally from [facility], with no formal psychiatric history, medical history of HTN and CVA ?5 years ago with residual weakness and dysarthria who was brought to the ED by EMS activated by daughter due to concern for poor oral intake and low energy over several months, with 20-30lb weight loss. Pt admitted to medicine for management of failure to thrive. Psychiatry consulted to assess for potential role of depression in pt's decline, seen today for f/u.  Pt started on mirtazapine 7.5mg yesterday evening for depressive symptoms and appetite stimulation.    On evaluation this AM, pt found awake, alert, speaking with daughter on phone prior to evaluation. He reported to be feeling upset because he waited hours to work with PT yesterday and then overnight, was incontinent of urine and stool but unable to get staff assistance. He slept poorly and is angry this morning, asking for different staff to care for him. He expressed desire to leave the hospital. He agreed to continue trial of mirtazapine for his appetite, though does not wish to take medication for mood. He declined further assessment. No voiced SI/HI/AVH.    Collateral obtained yesterday afternoon from pt's daughter Alberto ([phone]) by [employer] student:  About 2-3 months ago pt started refusing to eat and drinking less. He lost about 20-30 pounds over this 3 month period which started about the time that his wife started to have health problems. Pt does not physically take care of his wife but he helps with the bills, making appointments, and administrative work. Daughter states his wife stopped cooking about a year ago and he used to love eating her food which may be why he is saying the food he has now is not good. 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 Pt does not have cognitive decline but he does have difficulty speaking since his stroke. Pt has never expressed suicidal ideation, and has denied being depressed when asked by the daughter. In the past, pt had a hobby of buying things and organizing all of his belongings, which he has not done for the past 2-3 months. He is now no longer getting out of bed or going downstairs to work on organizing his things. Daughter states pt does not like taking medications because he likes more of a holistic, natural approach to medical treatments and he is “aware of side effects of medications”. She states he told her a few hours ago he would be willing to try mirtazapine if it helps with appetite stimulation. She emphasized that he will not take the medication if he thinks it is for depression but he may be more open to the idea if he knew it would help him to eat better. No known prior psychiatric history or safety concerns.

## 2020-09-10 NOTE — DISCHARGE NOTE PROVIDER - NSDCCPCAREPLAN_GEN_ALL_CORE_FT
PRINCIPAL DISCHARGE DIAGNOSIS  Diagnosis: Weakness  Assessment and Plan of Treatment: You were      SECONDARY DISCHARGE DIAGNOSES  Diagnosis: Depression  Assessment and Plan of Treatment:     Diagnosis: Macrocytic anemia  Assessment and Plan of Treatment:     Diagnosis: Acute kidney injury  Assessment and Plan of Treatment: PRINCIPAL DISCHARGE DIAGNOSIS  Diagnosis: Weakness  Assessment and Plan of Treatment: You were      SECONDARY DISCHARGE DIAGNOSES  Diagnosis: Macrocytic anemia  Assessment and Plan of Treatment:     Diagnosis: Acute kidney injury  Assessment and Plan of Treatment: PRINCIPAL DISCHARGE DIAGNOSIS  Diagnosis: Weakness  Assessment and Plan of Treatment: You were admitted to the hospital for lower extremity weakness. We attribute your weakness to a condition called Failure to Thrive (FTT). FTT happens when an older adult has a loss of appetits, eats and drinks less than usual, loses weight, and is less active than normal. This could be due to the significant stress you are experiencing with your wife's health.   We started you on a medication called mirtazapine to help stimulate your appetite and reduce your stress. It is important to take your medication as directed. Please follow up with your primary care physician. We scheduled you an appointment with Dr. Alex De on 9/16 at 11am.  Thank you for allowing us to take part in your care.      SECONDARY DISCHARGE DIAGNOSES  Diagnosis: Macrocytic anemia  Assessment and Plan of Treatment: During your hospitalization you were found to have anemia. Anemia is a condition in which you lack enough healthy red blood cells to carry adequate oxygen to your body's tissues. Having anemia can make you feel tired and weak. There are many causes of anemia. We believe your anemia is due to a combination of poor nutritional intake leading to nutritional deficiency and your past medical history of chronic anemia.   We recommend you follow up with a hematologist in 1-2 weeks.   If your symptoms worsen where you feel very tired and weak, please return to the hospital.    Diagnosis: Acute kidney injury  Assessment and Plan of Treatment: PRINCIPAL DISCHARGE DIAGNOSIS  Diagnosis: Weakness  Assessment and Plan of Treatment: You were admitted to the hospital for lower extremity weakness. We attribute your weakness to a condition called Failure to Thrive (FTT). FTT happens when an older adult has a loss of appetits, eats and drinks less than usual, loses weight, and is less active than normal. This could be due to the significant stress you are experiencing with your wife's health.   We started you on a medication called mirtazapine to help stimulate your appetite and reduce your stress. It is important to take your medication as directed. Please follow up with your primary care physician. We scheduled you an appointment with Dr. Alex De on 9/16 at 11am.  Thank you for allowing us to take part in your care.      SECONDARY DISCHARGE DIAGNOSES  Diagnosis: GI bleeding  Assessment and Plan of Treatment: During your hospital, we discovered that you were bleeding from inside your stomach. The gastrointestinal doctors identified ulcers in your stomach, which we believe were the source of your bleed. We started you on a medication that reduces the production of stomach acid to help stop the bleeding. You were also transfused with two units of red blood cells to replace the blood that was lost.   We scheduled you an appointment with the gastroenterologists (Dr. Ajith Burroughs) 9/30 at 11:30am. Please continue to take your medication as directed.  If your symptoms get worse, or if you notice that you are bleeding when you have bowel movements, please come back to the hospital.    Diagnosis: Macrocytic anemia  Assessment and Plan of Treatment: During your hospitalization you were found to have anemia. Anemia is a condition in which you lack enough healthy red blood cells to carry adequate oxygen to your body's tissues. Having anemia can make you feel tired and weak. There are many causes of anemia. We believe your anemia is due to a combination of your GI bleed, poor nutritional intake, and your past medical history of chronic anemia.   We recommend you follow up with a hematologist in 1-2 weeks.   If your symptoms worsen where you feel very tired and weak, please return to the hospital.

## 2020-09-10 NOTE — PROGRESS NOTE ADULT - PROBLEM SELECTOR PLAN 4
Pt experiencing a lot of stress due to wife's health, as well as physical limitations from CVA.  -obtain collateral from daughter  -consider starting Mirtazapine 15mg PO qd at bedtime

## 2020-09-10 NOTE — CONSULT NOTE ADULT - ASSESSMENT
75 year old  male with a past medical history of CVA with residual weakness of the left lower leg and left upper extremity limiting him to a mobility scooter) and hypertension who arrived to the hospital with a chief complaint of weakness. Found to have dehydration , CHAIM ,was evaluated by psych for depression, started on antidepressants now, had melena today    Melena: with macrocytic anemia  -hb trended down from 11> 7.1  -/64, mid tachycardia 102-106  -npo  -PPI IV BID  -will plan for EGD in the am  -give a unit of blood, monitor for further bleeding  -let GI fellow on call know if any hemodynamic change or further bleeding or drop in h/h  -check b12 /folate as macrocytic 75 year old  male with a past medical history of CVA with residual weakness of the left lower leg and left upper extremity limiting him to a mobility scooter) and hypertension who arrived to the hospital with a chief complaint of weakness. Found to have dehydration , CHAIM ,was evaluated by psych for depression, started on antidepressants now, had melena today    Melena: with macrocytic anemia  -hb trended down from 11> 7.1  -/64, mid tachycardia 102-106  -npo  -PPI IV BID  -will plan for EGD in the am  -give a unit of blood, monitor for further bleeding  -let GI fellow on call know if any hemodynamic change or further bleeding or drop in h/h  -check b12 /folate as macrocytic  -trend cbc q6-q8    recs discussed with team 75 year old  male with a past medical history of CVA with residual weakness of the left lower leg and left upper extremity limiting him to a mobility scooter) and hypertension who arrived to the hospital with a chief complaint of weakness. Found to have dehydration , CHAIM ,was evaluated by psych for depression, started on antidepressants now, had melena today    Melena: with macrocytic anemia  -hb trended down from 11> 7.1  -/64, mid tachycardia 102-106  -npo  -PPI IV BID  -plan for EGD in the am  -give a unit of blood, monitor for further bleeding  -let GI fellow on call know if any hemodynamic change or further bleeding or drop in h/h  -check b12 /folate as macrocytic  -trend cbc q6-q8    recs discussed with team

## 2020-09-10 NOTE — PROGRESS NOTE ADULT - SUBJECTIVE AND OBJECTIVE BOX
***INCOMPLETE***    OVERNIGHT EVENTS:   No acute events overnight    SUBJECTIVE:    Patient seen and examined at bedside. Pt had episode of melena in hospital sheets. Denies CP, SOB, abdominal pain, n/v    OBJECTIVE:  Vital Signs Last 24 Hrs  T(C): 36.7 (10 Sep 2020 11:44), Max: 36.7 (10 Sep 2020 05:53)  T(F): 98.1 (10 Sep 2020 11:44), Max: 98.1 (10 Sep 2020 05:53)  HR: 102 (10 Sep 2020 11:44) (98 - 102)  BP: 97/62 (10 Sep 2020 11:44) (97/62 - 109/76)  BP(mean): --  RR: 20 (10 Sep 2020 11:44) (18 - 20)  SpO2: 98% (10 Sep 2020 11:44) (97% - 98%)    Physical Exam:  Gen: NAD, laying in bed, alert, interactive  HEENT: PERRL, anicteric sclera, no JVD, no thyromegaly  Cardio: +S1/S2, RRR, no murmurs  Resp: CTA b/l, no w/r/r  GI: +BS x4, NT/ND  Ext: no peripheral edema, NROM x4  Vasc: 3+ peripheral pulses  Skin: pale, cool, dry, and intact. no rashes, wounds or scars  Neuro: Residual weakness in LUE/LLE. Speech difficulties    Labs:                        7.4    9.52  )-----------( 237      ( 10 Sep 2020 11:32 )             22.7     09-10    149<H>  |  114<H>  |  75<H>  ----------------------------<  161<H>  3.0<L>   |  22  |  0.98    Ca    8.7      10 Sep 2020 10:29  Phos  2.8     09-10  Mg     2.0     09-10    TPro  5.6<L>  /  Alb  2.9<L>  /  TBili  0.5  /  DBili  x   /  AST  10  /  ALT  6<L>  /  AlkPhos  25<L>  09-10      Fingerstick  glucose:     MEDICATIONS  (STANDING):  cyanocobalamin 1000 MICROGram(s) Oral daily  influenza   Vaccine 0.5 milliLiter(s) IntraMuscular once  mirtazapine 7.5 milliGRAM(s) Oral at bedtime  pantoprazole  Injectable 40 milliGRAM(s) IV Push two times a day  potassium chloride   Solution 40 milliEquivalent(s) Oral once    MEDICATIONS  (PRN):      Allergies    No Known Allergies    Intolerances        RADIOLOGY & ADDITIONAL TESTS: Reviewed. OVERNIGHT EVENTS:   No acute events overnight    SUBJECTIVE:    Patient seen and examined at bedside. Pt had episode of melena in bedsheets. Denies CP, SOB, abdominal pain, n/v    OBJECTIVE:  Vital Signs Last 24 Hrs  T(C): 36.7 (10 Sep 2020 11:44), Max: 36.7 (10 Sep 2020 05:53)  T(F): 98.1 (10 Sep 2020 11:44), Max: 98.1 (10 Sep 2020 05:53)  HR: 102 (10 Sep 2020 11:44) (98 - 102)  BP: 97/62 (10 Sep 2020 11:44) (97/62 - 109/76)  BP(mean): --  RR: 20 (10 Sep 2020 11:44) (18 - 20)  SpO2: 98% (10 Sep 2020 11:44) (97% - 98%)    Physical Exam:  Gen: NAD, laying in bed, alert, interactive  HEENT: PERRL, anicteric sclera, no JVD, no thyromegaly  Cardio: +S1/S2, RRR, no murmurs  Resp: CTA b/l, no w/r/r  GI: +BS x4, NT/ND  Ext: no peripheral edema, NROM x4  Vasc: 3+ peripheral pulses  Skin: pale, cool, dry, and intact. no rashes, wounds or scars  Neuro: Residual weakness in LUE/LLE. Speech difficulties    Labs:                        7.4    9.52  )-----------( 237      ( 10 Sep 2020 11:32 )             22.7     09-10    149<H>  |  114<H>  |  75<H>  ----------------------------<  161<H>  3.0<L>   |  22  |  0.98    Ca    8.7      10 Sep 2020 10:29  Phos  2.8     09-10  Mg     2.0     09-10    TPro  5.6<L>  /  Alb  2.9<L>  /  TBili  0.5  /  DBili  x   /  AST  10  /  ALT  6<L>  /  AlkPhos  25<L>  09-10      Fingerstick  glucose:     MEDICATIONS  (STANDING):  cyanocobalamin 1000 MICROGram(s) Oral daily  influenza   Vaccine 0.5 milliLiter(s) IntraMuscular once  mirtazapine 7.5 milliGRAM(s) Oral at bedtime  pantoprazole  Injectable 40 milliGRAM(s) IV Push two times a day  potassium chloride   Solution 40 milliEquivalent(s) Oral once    MEDICATIONS  (PRN):      Allergies    No Known Allergies    Intolerances        RADIOLOGY & ADDITIONAL TESTS: Reviewed.

## 2020-09-10 NOTE — PROGRESS NOTE BEHAVIORAL HEALTH - NSBHCONSULTFOLLOWAFTERCARE_PSY_A_CORE FT
Referral provided to outpatient mental health care (see initial consult note).   If declined, encourage PCP f/u re: continuation of mirtazapine for appetite stimulation and depressive symptoms

## 2020-09-10 NOTE — CONSULT NOTE ADULT - ASSESSMENT
75M PMH CVA with residual weakness of the left lower leg and left upper extremity limiting him to a mobility scooter), and HTN presenting with chief complaint of weakness. Found to have dehydration, CHAIM ,was evaluated by psych for depression, started on antidepressants now, had 2 episodes of melena today with decrease in hb from 11 to 7.1. ICU consult for evaluation for further monitoring.     GI  # Melena:   Hb trended down from 11 to 7.1 with 2 episodes of large volume melanotic stool. /64, mid tachycardia 102-106. GI consulted with plans for EGD in the AM.   -NPO  -Protonix gtt  -plan for EGD in the am  -f/u post transfusion cbc  -maintain active type and screens  -maintain 2 large bore IVs    # hypernatremia  sodium uptrending to 153 now. Hypovolemic on exam. Likely in the setting of poor PO intake.  - start on D5W at rate of 120cc/hr  - monitor BMP q4hrs    Case discussed with Dr. Cee    Dispo: 7[place] 75M PMH CVA with residual weakness of the left lower leg and left upper extremity limiting him to a mobility scooter), and HTN presenting with chief complaint of weakness. Found to have dehydration, CHAIM ,was evaluated by psych for depression, started on antidepressants now, had 2 episodes of melena today with decrease in hb from 11 to 7.1. ICU consult for evaluation for further monitoring.     GI  # Melena:   Hb trended down from 11 to 7.1 with 2 episodes of large volume melanotic stool. /64, mid tachycardia 102-106. GI consulted with plans for EGD in the AM. Patient likely hemoconcentrated on admission with baseline anemia. ICU team will continue to closely monitor.  -NPO  -Protonix gtt  -plan for EGD in the am  -transfuse with 2 units pRBCs  -f/u post transfusion cbc  -maintain active type and screens  -maintain 2 large bore IVs     # hypernatremia  sodium uptrending to 153 now. Hypovolemic on exam. Likely in the setting of poor PO intake.  - start on D5W at rate of 120cc/hr  - monitor BMP q4hrs    Case discussed with Dr. Cee    Dispo: YAMILKA 75M PMH CVA with residual weakness of the left lower leg and left upper extremity limiting him to a mobility scooter), and HTN presenting with chief complaint of weakness. Found to have dehydration, CHAIM ,was evaluated by psych for depression, started on antidepressants now, had 2 episodes of melena today with decrease in hb from 11 to 7.1. ICU consult for evaluation for further monitoring.     GI  # Melena:   Hb trended down from 11 to 7.1 with 2 episodes of large volume melanotic stool. /64, mid tachycardia 102-106. GI consulted with plans for EGD in the AM. Patient likely hemoconcentrated on admission with baseline anemia. ICU team will continue to closely monitor.  - NPO  - Protonix gtt  - plan for EGD in the am  - transfuse with 2 units pRBCs  - f/u post transfusion cbc  - maintain active type and screens  - maintain 2 large bore IVs  - GI following  - if hemodynamically unstable and/or hamzah blood per rectum, call ICU.      # hypernatremia  sodium uptrending to 153 now. Hypovolemic on exam. Likely in the setting of poor PO intake.   - Free water deficit 2L  - per nursing protocol, cannot run IV fluids with blood products  - repeat BMP with post transfusion cbc.  - recalculate free water deficit and start D5 accordingly after blood transfusion    #hypokalemia  hypokalemic to 3.0, likely in the setting of poor PO intake s/p [id number]  - f/u repeat BMP  - replete as needed    Case discussed with Dr. Cee    Dispo: [facility]

## 2020-09-10 NOTE — DISCHARGE NOTE PROVIDER - CARE PROVIDER_API CALL
Dr. Alex De  Phone: [phone]  Fax: (   )    -  Scheduled Appointment: 09/16/2020 11:00 AM Dr. Alex De  Phone: [phone]  Fax: (   )    -  Scheduled Appointment: 09/16/2020 11:00 AM    Ajith Burroughs  GASTROENTEROLOGY  [address] [place] [address]  Phone: [phone]  Fax: [phone]  Scheduled Appointment: 09/30/2020 11:30 AM

## 2020-09-10 NOTE — PROGRESS NOTE ADULT - PROBLEM SELECTOR PLAN 5
(RESOLVED)  Cr 1.72 on admission, now improved to 1.26 s/p IVF. Likely pre-renal 2/2 hypovolemia from poor PO intake vs GIB.   -continue to monitor BMP

## 2020-09-10 NOTE — PROGRESS NOTE ADULT - PROBLEM SELECTOR PLAN 3
Hgb 11.6 and  on admission. Possibly 2/2 B12/folate deficiency due to poor PO intake  -f/u B12 and Folate

## 2020-09-10 NOTE — PROGRESS NOTE BEHAVIORAL HEALTH - NSBHCHARTREVIEWLAB_PSY_A_CORE FT
No new labs  BMP 9/8 with improving BUN (still elevated) and Cr  TSH, B12, folate still pending    Urine culture 9/8 with insignificant growth

## 2020-09-10 NOTE — DISCHARGE NOTE PROVIDER - NSDCFUADDAPPT_GEN_ALL_CORE_FT
Recommend outpatient referral to   [facility] ([facility])  www.spop.org  [address]  [address] [phone] (888)[facility]   a 24 hour mental health hotline that can identify in network clinics, support groups, and can offer emergency assistance We believe these resources will also be of great assistance.    [facility] ([facility])  www.spop.org  [address]  [address] [phone] (888)[facility]   a 24 hour mental health hotline that can identify in network clinics, support groups, and can offer emergency assistance

## 2020-09-10 NOTE — PROGRESS NOTE ADULT - ASSESSMENT
per Internal Medicine    76 yo M w/ a PMHx of CVA (weakness LUE/LLE with mobility limited to scooter) and HTN admitted for weakness. Hospital course complicated by GI bleed.     Likely 2/2 poor PO intake from mouth discomfort and stress from wife's health.   -f/u speech and swallow  -f/u nutrition consult  -f/u TSH  -f/u PT consult  -f/u social work    Problem/Plan - 1:  ·  Problem: GI bleed.  Plan: Pt had episode of melanotic stool in bed sheets. Hgb 11.6 on admission, now 7.4. Pt orthostatic positive.   -GI consulted, f/u recs  -PPI drip  -NPO  -LR 1L  -Type and Screen  -Transfuse 1U pRBC following type and screen results  -f/u CBC, CMP.     Problem/Plan - 2:  ·  Problem: Anemia.  Plan: Hgb 11.6 and  on admission. Likely 2/2 GI bleed, history of anemia, and B12 deficiency due to poor PO intake. Daughter states pt takes Vit B12 1000mcg qd at home, but noncompliant.   -c/w vit B12 1000mcg qd  -see plan GI bleed.     Problem/Plan - 3:  ·  Problem: Weakness.  Plan: Pt presented to hospital with weakness. Likely 2/2 combination of GIB, chronic anemia, and poor PO intake  -see plan for GI Bleed  -see plan for Anemia.     Problem/Plan - 4:  ·  Problem: R/O Depression.  Plan: Pt experiencing significant stress due to wife's health, as well as physical limitations from CVA.  -c/w Mirtazapine 7.5mg PO qd at bedtime.     Problem/Plan - 5:  ·  Problem: CHAIM (acute kidney injury).  Plan: (RESOLVED)  Cr 1.72 on admission, now improved to 1.26 s/p IVF. Likely pre-renal 2/2 hypovolemia from poor PO intake vs GIB.   -continue to monitor BMP.     Problem/Plan - 6:  Problem: Prophylactic measure. Plan: F: IVF   E: Repleat K>4 Mg>2  N: NPO  DVT: N/A d/t GI bleed  C: Full Code.

## 2020-09-10 NOTE — PROGRESS NOTE ADULT - PROBLEM SELECTOR PLAN 2
Pt found to have AG metabolic acidosis likely 2/2 starvation ketosis from poor PO intake. UA positive for ketones suggestive of starvation ketosis  -D5-NS 1L @ 110cc/h  -encourage PO intake

## 2020-09-10 NOTE — DISCHARGE NOTE PROVIDER - HOSPITAL COURSE
#Discharge: do not delete        76 yo M w/ a PMHx of CVA (weakness LUE/LLE with mobility limited to scooter) and HTN admitted for failure to thrive, likely due to poor PO intake. Found to have macrocytic anemia and CHAIM.        Problem List/Main Diagnoses (system-based):         #Failure to thrive in adult    Pt lost 20-30lbs over the past 2mo. Likely 2/2 decreased appetite due to a combination of stress and depression. Pt is very upset about wife's health condition. He also has challenges to ADL due to CVA 5 years ago causing him to have speech difficulties and LUE/LLE weakness, limiting his mobility    -f/u with outpatient psychotherapy        #Depression    Pt experiencing a lot of stress due to wife's health, as well as physical limitations from CVA.    -Mirtazapine 15mg PO qd at bedtime    -continue outpatient psychotherapy        #Macrocytic anemia    Hgb 11.6 and  on admission. Pt's daughter endorses pt has chronic anemia and takes vit B12 supplements. However pt inconsistently takes vitamins. Pt also has extremely limited caloric intake (daughter states ~100 calories/d)    -Vit B12 1000mcg PO qd    -encourage PO intake         #CHAIM (RESOLVED)    Cr 1.72 on admission, now improved to 1.26 s/p IV. Likely pre-renal 2/2 hypovolemia from poor PO intake.     -continue to encourage optimal PO intake            Inpatient treatment course:    76 yo M w/ a PMHx of CVA (weakness LUE/LLE with mobility limited to scooter) and HTN admitted for failure to thrive, likely due to poor PO intake. Pt found to have pre-renal CHAIM likely 2/2 hypovolemia from poor PO intake. Improved after D5-1/2NS. Pt also found to have AG metabolic acidosis that we attribute to starvation ketosis (ketones in UA and poor PO intake). Improved after D5-1/2NS. Pt endorses history of chronic anemia. Found to have macrocytic anemia that we attributed to B12 deficiency as patient's daughter states pt takes vit B12 supplements at home. However pt has been refusing to take medication at home. Takes vit B12 supplements at home, which we continued in hospital. Psychiatry was consulted to address depression and started patient on mirtazapine 7.5mg PO qd at bedtime.          New medications: Mirtazapine 7.5mg PO qd at bedtime        Labs to be followed outpatient: CBC, BMP        Exam to be followed outpatient: N/A #Discharge: do not delete        74 yo M w/ a PMHx of CVA (weakness LUE/LLE with mobility limited to scooter) and HTN admitted for failure to thrive, likely due to poor PO intake. Found to have macrocytic anemia and CHAIM.        Problem List/Main Diagnoses (system-based):         #Failure to thrive in adult    Pt lost 20-30lbs over the past 2mo. Likely 2/2 decreased appetite due to a combination of stress and depression. He also has challenges to ADL due to CVA 5 years ago causing him to have speech difficulties and LUE/LLE weakness, limiting his mobility. Pt experiencing a lot of stress due to wife's health, as well as physical limitations from CVA.    -f/u with outpatient psychotherapy    -Mirtazapine 15mg PO qd at bedtime    -f/u outpatient psychotherapy        #Macrocytic anemia    Hgb 11.6 and  on admission. Pt's daughter endorses pt has chronic anemia and takes vit B12 supplements. However pt inconsistently takes vitamins. Pt also has extremely limited caloric intake (daughter states ~100 calories/d)    -Vit B12 1000mcg PO qd    -encourage PO intake         #CHAIM (RESOLVED)    Cr 1.72 on admission, now improved to 1.26 s/p IV. Likely pre-renal 2/2 hypovolemia from poor PO intake.     -continue to encourage optimal PO intake            Inpatient treatment course:    74 yo M w/ a PMHx of CVA (weakness LUE/LLE with mobility limited to scooter) and HTN admitted for failure to thrive, likely due to poor PO intake. Pt found to have pre-renal CHAIM likely 2/2 hypovolemia from poor PO intake. Improved after D5-1/2NS. Pt also found to have AG metabolic acidosis that we attribute to starvation ketosis (ketones in UA and poor PO intake). Improved after D5-1/2NS. Pt endorses history of chronic anemia. Found to have macrocytic anemia that we attributed to B12 deficiency as patient's daughter states pt takes vit B12 supplements at home. However pt has been refusing to take medication at home. Takes vit B12 supplements at home, which we continued in hospital. Psychiatry was consulted to address depression and started patient on mirtazapine 7.5mg PO qd at bedtime.          New medications: Mirtazapine 7.5mg PO qd at bedtime        Labs to be followed outpatient: CBC, BMP        Exam to be followed outpatient: N/A #Discharge: do not delete        74 yo M w/ a PMHx of CVA (weakness LUE/LLE with mobility limited to scooter) and HTN admitted for failure to thrive, likely due to poor PO intake. Found to have macrocytic anemia and CHAIM.        Problem List/Main Diagnoses (system-based):         #Failure to thrive in adult    Pt lost 20-30lbs over the past 2mo. Likely 2/2 decreased appetite due to a combination of stress and depression. He also has challenges to ADL due to CVA 5 years ago causing him to have speech difficulties and LUE/LLE weakness, limiting his mobility. Pt experiencing a lot of stress due to wife's health, as well as physical limitations from CVA.    -f/u with outpatient psychotherapy    -Mirtazapine 15mg PO qd at bedtime    -f/u outpatient psychotherapy        #GI Bleed    Pt had episode of melanotic stool in bed sheets. EGD showed two clean based ulcers w/ no active bleeding    -s/p 2U pRBC    -c/w PPI BID        #Macrocytic anemia    Hgb 11.6 and  on admission. Pt's daughter endorses pt has chronic anemia and takes vit B12 supplements. However pt inconsistently takes vitamins. Pt also has extremely limited caloric intake (daughter states ~100 calories/d)    -Vit B12 1000mcg PO qd    -Folic acid 1mg PO qd    -encourage PO intake             Inpatient treatment course:    74 yo M w/ a PMHx of CVA (weakness LUE/LLE with mobility limited to scooter) and HTN admitted for failure to thrive, likely due to poor PO intake. Pt found to have pre-renal CHAIM likely 2/2 hypovolemia from poor PO intake. Improved after D5-1/2NS. Pt also found to have AG metabolic acidosis that we attribute to starvation ketosis (ketones in UA and poor PO intake). Improved after D5-1/2NS. Pt endorses history of chronic anemia. Found to have macrocytic anemia that we attributed to B12 deficiency as patient's daughter states pt takes vit B12 supplements at home. However pt has been refusing to take medication at home. Takes vit B12 supplements at home, which we continued in hospital. Psychiatry was consulted to address depression and started patient on mirtazapine 7.5mg PO qd at bedtime.         Hospital course complicated by UGIB 2/2 two ulcers discovered on EGD. Transfused w/ 2U pRBC and started on protonix 40mg PO BID.          New medications: Mirtazapine 7.5mg PO qd at bedtime, Protonix 40mg PO BID        Labs to be followed outpatient: CBC, BMP        Exam to be followed outpatient: N/A

## 2020-09-10 NOTE — PROGRESS NOTE ADULT - ASSESSMENT
76 yo M w/ a PMHx of CVA (weakness LUE/LLE with mobility limited to scooter) and HTN admitted for weakness. Hospital course complicated by GI bleed.     Likely 2/2 poor PO intake from mouth discomfort and stress from wife's health.   -f/u speech and swallow  -f/u nutrition consult  -f/u TSH  -f/u PT consult  -f/u social work 76 yo M w/ a PMHx of CVA (weakness LUE/LLE with mobility limited to scooter) and HTN admitted for weakness. Hospital course complicated by GI bleed.

## 2020-09-10 NOTE — PROGRESS NOTE BEHAVIORAL HEALTH - OTHER
upset Focused on anger re: hospital care overnight. no endorsed hopelessness or SI. No expressed delusions or paranoia no focal deficits - impaired on formal testing yesterday limited insight into potential role of depression in recent symptoms soiled motor exam deferred due to COVID precautions stable posture, gait not assessed

## 2020-09-10 NOTE — PROGRESS NOTE ADULT - PROBLEM SELECTOR PLAN 4
Pt experiencing significant stress due to wife's health, as well as physical limitations from CVA.  -c/w Mirtazapine 7.5mg PO qd at bedtime Pt presented to hospital with weakness. Likely 2/2 combination of GIB, chronic anemia, and poor PO intake  -see plan for GI Bleed  -see plan for Anemia

## 2020-09-10 NOTE — DISCHARGE NOTE PROVIDER - NSDCFUSCHEDAPPT_GEN_ALL_CORE_FT
TAYLOR LARA ; 09/18/2020 ; NPP [facility] [address] TAYLOR LARA ; 09/30/2020 ; NPP Gastro [address]   TAYLOR LARA ; 09/30/2020 ; NPP Gastro [address]

## 2020-09-10 NOTE — DISCHARGE NOTE PROVIDER - NSDCMRMEDTOKEN_GEN_ALL_CORE_FT
cyanocobalamin 1000 mcg oral tablet: 1 tab(s) orally once a day  mirtazapine 7.5 mg oral tablet: 1 tab(s) orally once a day (at bedtime) cyanocobalamin 1000 mcg oral tablet: 1 tab(s) orally once a day  folic acid 1 mg oral tablet: 1 tab(s) orally once a day  mirtazapine 7.5 mg oral tablet: 1 tab(s) orally once a day (at bedtime)  pantoprazole 40 mg oral delayed release tablet: 1 tab(s) orally every 12 hours cyanocobalamin 1000 mcg oral tablet: 1 tab(s) orally once a day  folic acid 1 mg oral tablet: 1 tab(s) orally once a day  mirtazapine 7.5 mg oral tablet: 1 tab(s) orally once a day (at bedtime)  pantoprazole 40 mg oral delayed release tablet: 1 tab(s) orally every 12 hours

## 2020-09-10 NOTE — CONSULT NOTE ADULT - SUBJECTIVE AND OBJECTIVE BOX
ICU CONSULT NOTE    CHIEF COMPLAINT: Patient is a 75y old  Male who presents with a chief complaint of Weakness (10 Sep 2020 17:10)      HPI:  75M PMH CVA with residual weakness of the left lower leg and left upper extremity limiting him to a mobility scooter) and hypertension who arrived to the hospital with a chief complaint of weakness. Found to have dehydration, CHAIM ,was evaluated by psych for depression, started on antidepressants now. The patient stated that his symptoms of weakness began approximately two months ago. Over the past two months the patient has had 20 to 30 pound weight loss that he attributes to not eating.  He denies any recent mouth trauma, swelling, erythema, dysphagia, fever, night sweats, chills. The patient states he feels tired more than normal and has lower energy levels. He states that the COVID environment has made him less happy. Patient denies any n/v/fever/chills. Has mild epigastric pain. He never had an EGD or colonoscopy  Plan was for discharge today but pt had 2 episodes of melena today with subsequent drop in hb from 11.6 to 7.1, VSS remained stable. He received 1L of NS and one unit of pRBC. GI consulted with anticipated EGD tomorrow.       PAST MEDICAL & SURGICAL HISTORY:  Cerebrovascular accident (CVA)  HTN (hypertension)  No significant past surgical history      REVIEW OF SYSTEMS: negative except as stated in HPI.    MEDICATIONS  (STANDING):  cyanocobalamin 1000 MICROGram(s) Oral daily  influenza   Vaccine 0.5 milliLiter(s) IntraMuscular once  mirtazapine 7.5 milliGRAM(s) Oral at bedtime  pantoprazole Infusion 8 mG/Hr (10 mL/Hr) IV Continuous <Continuous>    MEDICATIONS  (PRN):      Allergies    No Known Allergies    Intolerances        FAMILY HISTORY:  No pertinent family history in first degree relatives      SOCIAL HISTORY:     Vital Signs Last 24 Hrs  T(C): 36.6 (10 Sep 2020 17:02), Max: 36.7 (10 Sep 2020 05:53)  T(F): 97.9 (10 Sep 2020 17:02), Max: 98.1 (10 Sep 2020 05:53)  HR: 106 (10 Sep 2020 17:02) (98 - 106)  BP: 108/62 (10 Sep 2020 17:02) (97/62 - 109/76)  BP(mean): --  RR: 20 (10 Sep 2020 17:02) (18 - 20)  SpO2: 98% (10 Sep 2020 17:02) (97% - 98%)    PHYSICAL EXAM:  GEN: lethargic, pale, NAD  HEENT: missing teeth, PERRL, no relative afferent pupillary defect, EOMI, moist MM, no pharyngeal erythema or exudate  CV: RRR, S1/S2, no murmurs appreciated, no JVD, no carotid bruits  RESP: CTA bilaterally, good respiratory effort, good air movement, no wheezes/rales  ABD: soft, BS+, nontender, nondistended, no guarding/rebound  EXTREMITIES: WWP, pulses 2+ in all 4 extremities, no peripheral edema  MSK: full range of motion of all 4 extremities  SKIN: pale, cool, dry, and intact. no rashes, wounds or scars  NEURO: A&Ox3, residual lower extremity weakness, dysarthria      LABS: reviewed.    CAPILLARY BLOOD GLUCOSE                              6.6    9.63  )-----------( 217      ( 10 Sep 2020 18:30 )             20.5     09-10    149<H>  |  114<H>  |  75<H>  ----------------------------<  161<H>  3.0<L>   |  22  |  0.98    Ca    8.7      10 Sep 2020 10:29  Phos  2.8     09-10  Mg     2.0     09-10    TPro  5.6<L>  /  Alb  2.9<L>  /  TBili  0.5  /  DBili  x   /  AST  10  /  ALT  6<L>  /  AlkPhos  25<L>  09-10      LIVER FUNCTIONS - ( 10 Sep 2020 10:29 )  Alb: 2.9 g/dL / Pro: 5.6 g/dL / ALK PHOS: 25 U/L / ALT: 6 U/L / AST: 10 U/L / GGT: x                     Culture - Urine (collected 08 Sep 2020 16:25)  Source: .Urine Clean Catch (Midstream)  Final Report (09 Sep 2020 14:26):    Less than 10,000 cols/cc; Insignificant amount of growth.        RADIOLOGY AND ADDITIONAL TESTS: reviewed.    Chest XR:  EKG:  Echocardiogram:

## 2020-09-10 NOTE — PROGRESS NOTE ADULT - PROBLEM SELECTOR PLAN 1
Pt had episode of melanotic stool in bed sheets. Hgb 11.6 on admission, now 7.4. Pt orthostatic positive.   -GI consulted, f/u recs  -PPI drip  -NPO  -LR 1L  -Type and Screen  -Transfuse 1U pRBC following type and screen results  -f/u CBC, CMP Pt had episode of melanotic stool in bed sheets. Hgb 11.6 on admission, now 7.4. Pt orthostatic positive, tachycardic , BP 97/62. Pt denies abdominal pain. Possibly 2/2 UGIB vs malignancy (pt endorses 20-30 lb weight loss over past 2 months)  -GI consulted, f/u recs  -PPI drip  -NPO  -LR 1L  -Type and Screen  -Transfuse 1U pRBC following type and screen results  -f/u CBC, CMP

## 2020-09-11 ENCOUNTER — RESULT REVIEW (OUTPATIENT)
Age: 75
End: 2020-09-11

## 2020-09-11 ENCOUNTER — TRANSCRIPTION ENCOUNTER (OUTPATIENT)
Age: 75
End: 2020-09-11

## 2020-09-11 VITALS
TEMPERATURE: 98 F | HEART RATE: 74 BPM | OXYGEN SATURATION: 96 % | RESPIRATION RATE: 18 BRPM | SYSTOLIC BLOOD PRESSURE: 116 MMHG | DIASTOLIC BLOOD PRESSURE: 65 MMHG

## 2020-09-11 DIAGNOSIS — E87.0 HYPEROSMOLALITY AND HYPERNATREMIA: ICD-10-CM

## 2020-09-11 LAB
ALBUMIN SERPL ELPH-MCNC: 2.9 G/DL — LOW (ref 3.3–5)
ALBUMIN SERPL ELPH-MCNC: 3.2 G/DL — LOW (ref 3.3–5)
ALP SERPL-CCNC: 28 U/L — LOW (ref 40–120)
ALP SERPL-CCNC: 28 U/L — LOW (ref 40–120)
ALT FLD-CCNC: 5 U/L — LOW (ref 10–45)
ALT FLD-CCNC: 6 U/L — LOW (ref 10–45)
ANION GAP SERPL CALC-SCNC: 10 MMOL/L — SIGNIFICANT CHANGE UP (ref 5–17)
ANION GAP SERPL CALC-SCNC: 11 MMOL/L — SIGNIFICANT CHANGE UP (ref 5–17)
ANION GAP SERPL CALC-SCNC: 12 MMOL/L — SIGNIFICANT CHANGE UP (ref 5–17)
AST SERPL-CCNC: 11 U/L — SIGNIFICANT CHANGE UP (ref 10–40)
AST SERPL-CCNC: 15 U/L — SIGNIFICANT CHANGE UP (ref 10–40)
BASOPHILS # BLD AUTO: 0.06 K/UL — SIGNIFICANT CHANGE UP (ref 0–0.2)
BASOPHILS NFR BLD AUTO: 0.9 % — SIGNIFICANT CHANGE UP (ref 0–2)
BILIRUB SERPL-MCNC: 1.2 MG/DL — SIGNIFICANT CHANGE UP (ref 0.2–1.2)
BILIRUB SERPL-MCNC: 1.5 MG/DL — HIGH (ref 0.2–1.2)
BUN SERPL-MCNC: 50 MG/DL — HIGH (ref 7–23)
BUN SERPL-MCNC: 60 MG/DL — HIGH (ref 7–23)
BUN SERPL-MCNC: 73 MG/DL — HIGH (ref 7–23)
CALCIUM SERPL-MCNC: 8.5 MG/DL — SIGNIFICANT CHANGE UP (ref 8.4–10.5)
CALCIUM SERPL-MCNC: 8.6 MG/DL — SIGNIFICANT CHANGE UP (ref 8.4–10.5)
CALCIUM SERPL-MCNC: 9.2 MG/DL — SIGNIFICANT CHANGE UP (ref 8.4–10.5)
CHLORIDE SERPL-SCNC: 117 MMOL/L — HIGH (ref 96–108)
CHLORIDE SERPL-SCNC: 118 MMOL/L — HIGH (ref 96–108)
CHLORIDE SERPL-SCNC: 122 MMOL/L — HIGH (ref 96–108)
CO2 SERPL-SCNC: 21 MMOL/L — LOW (ref 22–31)
CO2 SERPL-SCNC: 22 MMOL/L — SIGNIFICANT CHANGE UP (ref 22–31)
CO2 SERPL-SCNC: 22 MMOL/L — SIGNIFICANT CHANGE UP (ref 22–31)
CREAT SERPL-MCNC: 0.8 MG/DL — SIGNIFICANT CHANGE UP (ref 0.5–1.3)
CREAT SERPL-MCNC: 0.88 MG/DL — SIGNIFICANT CHANGE UP (ref 0.5–1.3)
CREAT SERPL-MCNC: 1.02 MG/DL — SIGNIFICANT CHANGE UP (ref 0.5–1.3)
EOSINOPHIL # BLD AUTO: 0.12 K/UL — SIGNIFICANT CHANGE UP (ref 0–0.5)
EOSINOPHIL NFR BLD AUTO: 1.8 % — SIGNIFICANT CHANGE UP (ref 0–6)
FOLATE SERPL-MCNC: 6.4 NG/ML — SIGNIFICANT CHANGE UP
GLUCOSE SERPL-MCNC: 108 MG/DL — HIGH (ref 70–99)
GLUCOSE SERPL-MCNC: 111 MG/DL — HIGH (ref 70–99)
GLUCOSE SERPL-MCNC: 119 MG/DL — HIGH (ref 70–99)
HCT VFR BLD CALC: 23.6 % — LOW (ref 39–50)
HCT VFR BLD CALC: 25.5 % — LOW (ref 39–50)
HCT VFR BLD CALC: 29 % — LOW (ref 39–50)
HCT VFR BLD CALC: 29.7 % — LOW (ref 39–50)
HGB BLD-MCNC: 7.2 G/DL — LOW (ref 13–17)
HGB BLD-MCNC: 8.5 G/DL — LOW (ref 13–17)
HGB BLD-MCNC: 9.5 G/DL — LOW (ref 13–17)
HGB BLD-MCNC: 9.7 G/DL — LOW (ref 13–17)
LACTATE SERPL-SCNC: 1.9 MMOL/L — SIGNIFICANT CHANGE UP (ref 0.5–2)
LYMPHOCYTES # BLD AUTO: 1.32 K/UL — SIGNIFICANT CHANGE UP (ref 1–3.3)
LYMPHOCYTES # BLD AUTO: 19.3 % — SIGNIFICANT CHANGE UP (ref 13–44)
MAGNESIUM SERPL-MCNC: 2 MG/DL — SIGNIFICANT CHANGE UP (ref 1.6–2.6)
MAGNESIUM SERPL-MCNC: 2.2 MG/DL — SIGNIFICANT CHANGE UP (ref 1.6–2.6)
MCHC RBC-ENTMCNC: 30.5 GM/DL — LOW (ref 32–36)
MCHC RBC-ENTMCNC: 32.7 GM/DL — SIGNIFICANT CHANGE UP (ref 32–36)
MCHC RBC-ENTMCNC: 32.8 GM/DL — SIGNIFICANT CHANGE UP (ref 32–36)
MCHC RBC-ENTMCNC: 33.3 GM/DL — SIGNIFICANT CHANGE UP (ref 32–36)
MCHC RBC-ENTMCNC: 33.7 PG — SIGNIFICANT CHANGE UP (ref 27–34)
MCHC RBC-ENTMCNC: 33.8 PG — SIGNIFICANT CHANGE UP (ref 27–34)
MCHC RBC-ENTMCNC: 34.2 PG — HIGH (ref 27–34)
MCHC RBC-ENTMCNC: 34.3 PG — HIGH (ref 27–34)
MCV RBC AUTO: 101.2 FL — HIGH (ref 80–100)
MCV RBC AUTO: 103.2 FL — HIGH (ref 80–100)
MCV RBC AUTO: 104.6 FL — HIGH (ref 80–100)
MCV RBC AUTO: 112.4 FL — HIGH (ref 80–100)
MONOCYTES # BLD AUTO: 0.18 K/UL — SIGNIFICANT CHANGE UP (ref 0–0.9)
MONOCYTES NFR BLD AUTO: 2.6 % — SIGNIFICANT CHANGE UP (ref 2–14)
NEUTROPHILS # BLD AUTO: 5.15 K/UL — SIGNIFICANT CHANGE UP (ref 1.8–7.4)
NEUTROPHILS NFR BLD AUTO: 75.4 % — SIGNIFICANT CHANGE UP (ref 43–77)
NRBC # BLD: 0 /100 WBCS — SIGNIFICANT CHANGE UP (ref 0–0)
NRBC # BLD: SIGNIFICANT CHANGE UP /100 WBCS (ref 0–0)
PHOSPHATE SERPL-MCNC: 2.9 MG/DL — SIGNIFICANT CHANGE UP (ref 2.5–4.5)
PHOSPHATE SERPL-MCNC: 3.2 MG/DL — SIGNIFICANT CHANGE UP (ref 2.5–4.5)
PLATELET # BLD AUTO: 160 K/UL — SIGNIFICANT CHANGE UP (ref 150–400)
PLATELET # BLD AUTO: 185 K/UL — SIGNIFICANT CHANGE UP (ref 150–400)
PLATELET # BLD AUTO: 198 K/UL — SIGNIFICANT CHANGE UP (ref 150–400)
PLATELET # BLD AUTO: 204 K/UL — SIGNIFICANT CHANGE UP (ref 150–400)
POTASSIUM SERPL-MCNC: 3.2 MMOL/L — LOW (ref 3.5–5.3)
POTASSIUM SERPL-MCNC: 3.7 MMOL/L — SIGNIFICANT CHANGE UP (ref 3.5–5.3)
POTASSIUM SERPL-MCNC: 3.8 MMOL/L — SIGNIFICANT CHANGE UP (ref 3.5–5.3)
POTASSIUM SERPL-SCNC: 3.2 MMOL/L — LOW (ref 3.5–5.3)
POTASSIUM SERPL-SCNC: 3.7 MMOL/L — SIGNIFICANT CHANGE UP (ref 3.5–5.3)
POTASSIUM SERPL-SCNC: 3.8 MMOL/L — SIGNIFICANT CHANGE UP (ref 3.5–5.3)
PROT SERPL-MCNC: 5.2 G/DL — LOW (ref 6–8.3)
PROT SERPL-MCNC: 5.8 G/DL — LOW (ref 6–8.3)
RBC # BLD: 2.1 M/UL — LOW (ref 4.2–5.8)
RBC # BLD: 2.52 M/UL — LOW (ref 4.2–5.8)
RBC # BLD: 2.81 M/UL — LOW (ref 4.2–5.8)
RBC # BLD: 2.84 M/UL — LOW (ref 4.2–5.8)
RBC # FLD: 19.8 % — HIGH (ref 10.3–14.5)
RBC # FLD: 20.7 % — HIGH (ref 10.3–14.5)
RBC # FLD: 21.2 % — HIGH (ref 10.3–14.5)
RBC # FLD: 22.6 % — HIGH (ref 10.3–14.5)
SODIUM SERPL-SCNC: 150 MMOL/L — HIGH (ref 135–145)
SODIUM SERPL-SCNC: 152 MMOL/L — HIGH (ref 135–145)
SODIUM SERPL-SCNC: 153 MMOL/L — HIGH (ref 135–145)
VIT B12 SERPL-MCNC: >2000 PG/ML — HIGH (ref 232–1245)
WBC # BLD: 10.56 K/UL — HIGH (ref 3.8–10.5)
WBC # BLD: 10.87 K/UL — HIGH (ref 3.8–10.5)
WBC # BLD: 6.83 K/UL — SIGNIFICANT CHANGE UP (ref 3.8–10.5)
WBC # BLD: 9.01 K/UL — SIGNIFICANT CHANGE UP (ref 3.8–10.5)
WBC # FLD AUTO: 10.56 K/UL — HIGH (ref 3.8–10.5)
WBC # FLD AUTO: 10.87 K/UL — HIGH (ref 3.8–10.5)
WBC # FLD AUTO: 6.83 K/UL — SIGNIFICANT CHANGE UP (ref 3.8–10.5)
WBC # FLD AUTO: 9.01 K/UL — SIGNIFICANT CHANGE UP (ref 3.8–10.5)

## 2020-09-11 PROCEDURE — 88305 TISSUE EXAM BY PATHOLOGIST: CPT | Mod: 26

## 2020-09-11 PROCEDURE — 43239 EGD BIOPSY SINGLE/MULTIPLE: CPT | Mod: GC

## 2020-09-11 PROCEDURE — 99233 SBSQ HOSP IP/OBS HIGH 50: CPT | Mod: GC

## 2020-09-11 RX ORDER — SODIUM CHLORIDE 9 MG/ML
1000 INJECTION, SOLUTION INTRAVENOUS
Refills: 0 | Status: DISCONTINUED | OUTPATIENT
Start: 2020-09-11 | End: 2020-09-11

## 2020-09-11 RX ORDER — FOLIC ACID 0.8 MG
1 TABLET ORAL
Qty: 30 | Refills: 0
Start: 2020-09-11 | End: 2020-10-10

## 2020-09-11 RX ORDER — PANTOPRAZOLE SODIUM 20 MG/1
1 TABLET, DELAYED RELEASE ORAL
Qty: 60 | Refills: 0
Start: 2020-09-11 | End: 2020-10-10

## 2020-09-11 RX ORDER — POTASSIUM CHLORIDE 20 MEQ
40 PACKET (EA) ORAL ONCE
Refills: 0 | Status: COMPLETED | OUTPATIENT
Start: 2020-09-11 | End: 2020-09-11

## 2020-09-11 RX ORDER — SODIUM CHLORIDE 9 MG/ML
1000 INJECTION, SOLUTION INTRAVENOUS
Refills: 0 | Status: DISCONTINUED | OUTPATIENT
Start: 2020-09-11 | End: 2020-09-12

## 2020-09-11 RX ORDER — POTASSIUM CHLORIDE 20 MEQ
20 PACKET (EA) ORAL ONCE
Refills: 0 | Status: COMPLETED | OUTPATIENT
Start: 2020-09-11 | End: 2020-09-11

## 2020-09-11 RX ADMIN — SODIUM CHLORIDE 80 MILLILITER(S): 9 INJECTION, SOLUTION INTRAVENOUS at 21:41

## 2020-09-11 RX ADMIN — MIRTAZAPINE 7.5 MILLIGRAM(S): 45 TABLET, ORALLY DISINTEGRATING ORAL at 21:32

## 2020-09-11 RX ADMIN — PANTOPRAZOLE SODIUM 10 MG/HR: 20 TABLET, DELAYED RELEASE ORAL at 09:40

## 2020-09-11 RX ADMIN — SODIUM CHLORIDE 80 MILLILITER(S): 9 INJECTION, SOLUTION INTRAVENOUS at 09:40

## 2020-09-11 RX ADMIN — Medication 20 MILLIEQUIVALENT(S): at 06:19

## 2020-09-11 RX ADMIN — PREGABALIN 1000 MICROGRAM(S): 225 CAPSULE ORAL at 11:28

## 2020-09-11 RX ADMIN — SODIUM CHLORIDE 100 MILLILITER(S): 9 INJECTION, SOLUTION INTRAVENOUS at 04:18

## 2020-09-11 NOTE — CHART NOTE - NSCHARTNOTEFT_GEN_A_CORE
S/p EGD    impressions:	    1. Gastropathy, s/p biopsies    2. Two clean based ulcers seen in the bulb, no stigmata of recent/active bleeding.           recommendations:	    1. PPI bid for now  2. Full liquid diet today  3. Monitor for bleeding/melena

## 2020-09-11 NOTE — DISCHARGE NOTE NURSING/CASE MANAGEMENT/SOCIAL WORK - NSDCFUADDAPPT_GEN_ALL_CORE_FT
We believe these resources will also be of great assistance.    [facility] ([facility])  www.spop.org  [address]  [address] [phone])formerly [facility]   a 24 hour mental health hotline that can identify in network clinics, support groups, and can offer emergency assistance

## 2020-09-11 NOTE — DISCHARGE NOTE NURSING/CASE MANAGEMENT/SOCIAL WORK - PATIENT PORTAL LINK FT
You can access the FollowMyHealth Patient Portal offered by [facility] by registering at the following website: [email or web address]. By joining [employer]’s FollowMyHealth portal, you will also be able to view your health information using other applications (apps) compatible with our system.

## 2020-09-11 NOTE — PROGRESS NOTE ADULT - ASSESSMENT
74 yo M w/ a PMHx of CVA (weakness LUE/LLE with mobility limited to scooter) and HTN admitted for weakness. Hospital course complicated by GI bleed.

## 2020-09-11 NOTE — PROGRESS NOTE ADULT - PROBLEM SELECTOR PLAN 7
F: IVF   E: Repleat K>4 Mg>2  N: NPO  DVT: N/A d/t GI bleed  C: Full Code F: IVF  E: Repleat K>4 Mg>2  N: Full liquid  DVT: N/A d/t GI bleed  C: Full Code

## 2020-09-11 NOTE — PROGRESS NOTE ADULT - PROBLEM SELECTOR PLAN 3
Hgb 11.6 and  on admission. Likely 2/2 GI bleed, history of anemia, and B12 deficiency due to poor PO intake. Daughter states pt takes Vit B12 1000mcg qd at home, but noncompliant.   -s/p 2U pRBC. Hgb improved to 9.5  -c/w home vit B12 1000mcg qd  -f/u B12 and folate  -see plan GI bleed

## 2020-09-11 NOTE — PROGRESS NOTE ADULT - SUBJECTIVE AND OBJECTIVE BOX
***INCOMPLETE***    OVERNIGHT EVENTS:   Received 2U pRBC. 2AM labs Hgb 9.5, lactate 1.9, Na 152. Free water deficit 1.7L. Started D5W 80cc/h    SUBJECTIVE:        OBJECTIVE:  Vital Signs Last 24 Hrs  T(C): 36.6 (11 Sep 2020 05:45), Max: 36.9 (10 Sep 2020 20:37)  T(F): 97.8 (11 Sep 2020 05:45), Max: 98.4 (10 Sep 2020 20:37)  HR: 79 (11 Sep 2020 05:45) (69 - 106)  BP: 117/81 (11 Sep 2020 05:45) (97/62 - 137/83)  BP(mean): --  RR: 18 (11 Sep 2020 05:45) (18 - 20)  SpO2: 100% (11 Sep 2020 05:45) (97% - 100%)    Physical Exam:      Labs:                        9.5    10.56 )-----------( 198      ( 11 Sep 2020 02:26 )             29.0     09-11    152<H>  |  118<H>  |  73<H>  ----------------------------<  108<H>  3.8   |  22  |  1.02    Ca    9.2      11 Sep 2020 02:26  Phos  3.2     09-11  Mg     2.2     09-11    TPro  5.8<L>  /  Alb  3.2<L>  /  TBili  1.5<H>  /  DBili  x   /  AST  15  /  ALT  6<L>  /  AlkPhos  28<L>  09-11      Fingerstick  glucose:     MEDICATIONS  (STANDING):  cyanocobalamin 1000 MICROGram(s) Oral daily  dextrose 5%. 1000 milliLiter(s) (80 mL/Hr) IV Continuous <Continuous>  influenza   Vaccine 0.5 milliLiter(s) IntraMuscular once  mirtazapine 7.5 milliGRAM(s) Oral at bedtime  pantoprazole Infusion 8 mG/Hr (10 mL/Hr) IV Continuous <Continuous>    MEDICATIONS  (PRN):      Allergies    No Known Allergies    Intolerances        RADIOLOGY & ADDITIONAL TESTS: Reviewed. OVERNIGHT EVENTS:   Received 2U pRBC. 2AM labs Hgb 9.5, lactate 1.9, Na 152. Free water deficit 1.7L. Started D5W 80cc/h    SUBJECTIVE:    Patient seen and examined at bedside after returning from EGD procedure. No acute complaints. Denies HA, CP, SOB, n/v.    OBJECTIVE:  Vital Signs Last 24 Hrs  T(C): 36.6 (11 Sep 2020 05:45), Max: 36.9 (10 Sep 2020 20:37)  T(F): 97.8 (11 Sep 2020 05:45), Max: 98.4 (10 Sep 2020 20:37)  HR: 79 (11 Sep 2020 05:45) (69 - 106)  BP: 117/81 (11 Sep 2020 05:45) (97/62 - 137/83)  BP(mean): --  RR: 18 (11 Sep 2020 05:45) (18 - 20)  SpO2: 100% (11 Sep 2020 05:45) (97% - 100%)    Physical Exam:  Gen: NAD, laying in bed, mildly sedated still from EGD  HEENT: PERRL, anicteric sclera, no JVD, no thyromegaly  Cardio: +S1/S2, RRR, no murmurs  Resp: CTA b/l, no w/r/r  GI: +BS x4, NT/ND  Ext: no peripheral edema, NROM x4  Vasc: 3+ peripheral pulses  Skin: warm, dry, and intact. no rashes, wounds or scars  Neuro: Residual weakness in LUE/LLE. Speech difficulties    Labs:                        9.5    10.56 )-----------( 198      ( 11 Sep 2020 02:26 )             29.0     09-11    152<H>  |  118<H>  |  73<H>  ----------------------------<  108<H>  3.8   |  22  |  1.02    Ca    9.2      11 Sep 2020 02:26  Phos  3.2     09-11  Mg     2.2     09-11    TPro  5.8<L>  /  Alb  3.2<L>  /  TBili  1.5<H>  /  DBili  x   /  AST  15  /  ALT  6<L>  /  AlkPhos  28<L>  09-11      Fingerstick  glucose:     MEDICATIONS  (STANDING):  cyanocobalamin 1000 MICROGram(s) Oral daily  dextrose 5%. 1000 milliLiter(s) (80 mL/Hr) IV Continuous <Continuous>  influenza   Vaccine 0.5 milliLiter(s) IntraMuscular once  mirtazapine 7.5 milliGRAM(s) Oral at bedtime  pantoprazole Infusion 8 mG/Hr (10 mL/Hr) IV Continuous <Continuous>    MEDICATIONS  (PRN):      Allergies    No Known Allergies    Intolerances        RADIOLOGY & ADDITIONAL TESTS: Reviewed.

## 2020-09-11 NOTE — PROGRESS NOTE ADULT - PROBLEM SELECTOR PLAN 1
Pt had episode of melanotic stool in bed sheets. Hgb 11.6 on admission, now 7.4. Pt orthostatic positive, tachycardic , BP 97/62. Pt denies abdominal pain. Possibly 2/2 UGIB vs malignancy (pt endorses 20-30 lb weight loss over past 2 months)  -GI consulted, f/u recs  -EGD planned for today  -s/p 2U pRBC  -c/w PPI drip  -NPO  -maintain active Type and Screen Pt had episode of melanotic stool in bed sheets. Hgb 11.6 on admission, now 7.4. Pt orthostatic positive, tachycardic , BP 97/62. Pt denies abdominal pain. Possibly 2/2 UGIB vs malignancy (pt endorses 20-30 lb weight loss over past 2 months)  -GI consulted, f/u recs  -s/p 2U pRBC  -s/p EGD (showed two clean based ulcers w/ no active bleeding)  -c/w PPI drip  -full liquid diet  -continue to monitor for bleeding/melena  -maintain active Type and Screen Pt had episode of melanotic stool in bed sheets. Hgb 11.6 on admission, now 7.4. Pt orthostatic positive, tachycardic , BP 97/62. Pt denies abdominal pain. Possibly 2/2 UGIB vs malignancy (pt endorses 20-30 lb weight loss over past 2 months)  -GI consulted, f/u recs  -s/p 2U pRBC  -s/p EGD (showed two clean based ulcers w/ no active bleeding)  -c/w PPI BID  -full liquid diet  -continue to monitor for bleeding/melena  -maintain active Type and Screen

## 2020-09-11 NOTE — PROGRESS NOTE ADULT - PROBLEM SELECTOR PLAN 5
Pt presented to hospital with weakness. Likely 2/2 combination of GIB, chronic anemia, and poor PO intake  -see plan for GI Bleed  -see plan for Anemia Pt presented to hospital with weakness. Likely 2/2 combination of GIB, chronic anemia, and poor PO intake  -see plan for GI Bleed  -see plan for Anemia  -see plan for FTT

## 2020-09-11 NOTE — PROGRESS NOTE ADULT - PROBLEM SELECTOR PLAN 4
Pt has loss of appetite, eating/drinking less than usual, lost 20-30 over past 2 months, and is less active than normal. Likely 2/2 stress of wife's health as well as physical limitations from CVA.  -Psych consulted, appreciate recs  -c/w mirtazapine 7.5mg qd PO at bedtime

## 2020-09-11 NOTE — PROGRESS NOTE ADULT - PROBLEM SELECTOR PLAN 2
Na downtrending. Likely 2/2 hypovolemia due to poor PO intake and GIB.   -free water deficit 1.7L   -c/w D5 80cc/h Na downtrending. Likely 2/2 hypovolemia due to poor PO intake and GIB.   -free water deficit 1.7L   -c/w D5 80cc/h  -f/u 2pm BMP and recalculate free water deficit. adjust fluids accordingly

## 2020-09-12 LAB
ANION GAP SERPL CALC-SCNC: 10 MMOL/L — SIGNIFICANT CHANGE UP (ref 5–17)
ANION GAP SERPL CALC-SCNC: 6 MMOL/L — SIGNIFICANT CHANGE UP (ref 5–17)
BUN SERPL-MCNC: 33 MG/DL — HIGH (ref 7–23)
BUN SERPL-MCNC: 36 MG/DL — HIGH (ref 7–23)
CALCIUM SERPL-MCNC: 7.7 MG/DL — LOW (ref 8.4–10.5)
CALCIUM SERPL-MCNC: 8.4 MG/DL — SIGNIFICANT CHANGE UP (ref 8.4–10.5)
CHLORIDE SERPL-SCNC: 114 MMOL/L — HIGH (ref 96–108)
CHLORIDE SERPL-SCNC: 115 MMOL/L — HIGH (ref 96–108)
CO2 SERPL-SCNC: 20 MMOL/L — LOW (ref 22–31)
CO2 SERPL-SCNC: 20 MMOL/L — LOW (ref 22–31)
CREAT SERPL-MCNC: 0.76 MG/DL — SIGNIFICANT CHANGE UP (ref 0.5–1.3)
CREAT SERPL-MCNC: 0.78 MG/DL — SIGNIFICANT CHANGE UP (ref 0.5–1.3)
GLUCOSE SERPL-MCNC: 105 MG/DL — HIGH (ref 70–99)
GLUCOSE SERPL-MCNC: 90 MG/DL — SIGNIFICANT CHANGE UP (ref 70–99)
HCT VFR BLD CALC: 25.8 % — LOW (ref 39–50)
HGB BLD-MCNC: 8.7 G/DL — LOW (ref 13–17)
MAGNESIUM SERPL-MCNC: 1.6 MG/DL — SIGNIFICANT CHANGE UP (ref 1.6–2.6)
MCHC RBC-ENTMCNC: 33.7 GM/DL — SIGNIFICANT CHANGE UP (ref 32–36)
MCHC RBC-ENTMCNC: 35.4 PG — HIGH (ref 27–34)
MCV RBC AUTO: 104.9 FL — HIGH (ref 80–100)
NRBC # BLD: 0 /100 WBCS — SIGNIFICANT CHANGE UP (ref 0–0)
PHOSPHATE SERPL-MCNC: 2.3 MG/DL — LOW (ref 2.5–4.5)
PLATELET # BLD AUTO: 172 K/UL — SIGNIFICANT CHANGE UP (ref 150–400)
POTASSIUM SERPL-MCNC: 3.8 MMOL/L — SIGNIFICANT CHANGE UP (ref 3.5–5.3)
POTASSIUM SERPL-MCNC: 5.7 MMOL/L — HIGH (ref 3.5–5.3)
POTASSIUM SERPL-SCNC: 3.8 MMOL/L — SIGNIFICANT CHANGE UP (ref 3.5–5.3)
POTASSIUM SERPL-SCNC: 5.7 MMOL/L — HIGH (ref 3.5–5.3)
RBC # BLD: 2.46 M/UL — LOW (ref 4.2–5.8)
RBC # FLD: 21.2 % — HIGH (ref 10.3–14.5)
SODIUM SERPL-SCNC: 141 MMOL/L — SIGNIFICANT CHANGE UP (ref 135–145)
SODIUM SERPL-SCNC: 144 MMOL/L — SIGNIFICANT CHANGE UP (ref 135–145)
WBC # BLD: 9.9 K/UL — SIGNIFICANT CHANGE UP (ref 3.8–10.5)
WBC # FLD AUTO: 9.9 K/UL — SIGNIFICANT CHANGE UP (ref 3.8–10.5)

## 2020-09-12 PROCEDURE — 99233 SBSQ HOSP IP/OBS HIGH 50: CPT | Mod: GC

## 2020-09-12 PROCEDURE — 84295 ASSAY OF SERUM SODIUM: CPT

## 2020-09-12 PROCEDURE — 80053 COMPREHEN METABOLIC PANEL: CPT

## 2020-09-12 PROCEDURE — 36415 COLL VENOUS BLD VENIPUNCTURE: CPT

## 2020-09-12 PROCEDURE — 85730 THROMBOPLASTIN TIME PARTIAL: CPT

## 2020-09-12 PROCEDURE — 99285 EMERGENCY DEPT VISIT HI MDM: CPT | Mod: 25

## 2020-09-12 PROCEDURE — 86850 RBC ANTIBODY SCREEN: CPT

## 2020-09-12 PROCEDURE — 97161 PT EVAL LOW COMPLEX 20 MIN: CPT

## 2020-09-12 PROCEDURE — 87086 URINE CULTURE/COLONY COUNT: CPT

## 2020-09-12 PROCEDURE — 82746 ASSAY OF FOLIC ACID SERUM: CPT

## 2020-09-12 PROCEDURE — 85610 PROTHROMBIN TIME: CPT

## 2020-09-12 PROCEDURE — 88305 TISSUE EXAM BY PATHOLOGIST: CPT

## 2020-09-12 PROCEDURE — 86923 COMPATIBILITY TEST ELECTRIC: CPT

## 2020-09-12 PROCEDURE — 83605 ASSAY OF LACTIC ACID: CPT

## 2020-09-12 PROCEDURE — 87635 SARS-COV-2 COVID-19 AMP PRB: CPT

## 2020-09-12 PROCEDURE — 93005 ELECTROCARDIOGRAM TRACING: CPT

## 2020-09-12 PROCEDURE — 82330 ASSAY OF CALCIUM: CPT

## 2020-09-12 PROCEDURE — 71045 X-RAY EXAM CHEST 1 VIEW: CPT

## 2020-09-12 PROCEDURE — P9016: CPT

## 2020-09-12 PROCEDURE — 85027 COMPLETE CBC AUTOMATED: CPT

## 2020-09-12 PROCEDURE — 84443 ASSAY THYROID STIM HORMONE: CPT

## 2020-09-12 PROCEDURE — 82607 VITAMIN B-12: CPT

## 2020-09-12 PROCEDURE — 84132 ASSAY OF SERUM POTASSIUM: CPT

## 2020-09-12 PROCEDURE — 86901 BLOOD TYPING SEROLOGIC RH(D): CPT

## 2020-09-12 PROCEDURE — 96360 HYDRATION IV INFUSION INIT: CPT

## 2020-09-12 PROCEDURE — 83735 ASSAY OF MAGNESIUM: CPT

## 2020-09-12 PROCEDURE — 84100 ASSAY OF PHOSPHORUS: CPT

## 2020-09-12 PROCEDURE — 85025 COMPLETE CBC W/AUTO DIFF WBC: CPT

## 2020-09-12 PROCEDURE — 36430 TRANSFUSION BLD/BLD COMPNT: CPT

## 2020-09-12 PROCEDURE — 92610 EVALUATE SWALLOWING FUNCTION: CPT

## 2020-09-12 PROCEDURE — 80048 BASIC METABOLIC PNL TOTAL CA: CPT

## 2020-09-12 PROCEDURE — 82803 BLOOD GASES ANY COMBINATION: CPT

## 2020-09-12 PROCEDURE — 96361 HYDRATE IV INFUSION ADD-ON: CPT

## 2020-09-12 RX ORDER — MAGNESIUM SULFATE 500 MG/ML
2 VIAL (ML) INJECTION ONCE
Refills: 0 | Status: COMPLETED | OUTPATIENT
Start: 2020-09-12 | End: 2020-09-12

## 2020-09-12 RX ORDER — PANTOPRAZOLE SODIUM 20 MG/1
1 TABLET, DELAYED RELEASE ORAL
Qty: 60 | Refills: 0
Start: 2020-09-12 | End: 2020-10-11

## 2020-09-12 RX ORDER — FOLIC ACID 0.8 MG
1 TABLET ORAL
Qty: 30 | Refills: 0
Start: 2020-09-12 | End: 2020-10-11

## 2020-09-12 RX ORDER — MIRTAZAPINE 45 MG/1
1 TABLET, ORALLY DISINTEGRATING ORAL
Qty: 30 | Refills: 0
Start: 2020-09-12 | End: 2020-10-11

## 2020-09-12 RX ORDER — PREGABALIN 225 MG/1
1 CAPSULE ORAL
Qty: 30 | Refills: 0
Start: 2020-09-12 | End: 2020-10-11

## 2020-09-12 RX ADMIN — Medication 50 GRAM(S): at 10:15

## 2020-09-13 ENCOUNTER — NON-APPOINTMENT (OUTPATIENT)
Age: 75
End: 2020-09-13

## 2020-09-14 PROBLEM — I10 ESSENTIAL (PRIMARY) HYPERTENSION: Chronic | Status: ACTIVE | Noted: 2020-09-08

## 2020-09-14 PROBLEM — I63.9 CEREBRAL INFARCTION, UNSPECIFIED: Chronic | Status: ACTIVE | Noted: 2020-09-08

## 2020-09-14 LAB — SURGICAL PATHOLOGY STUDY: SIGNIFICANT CHANGE UP

## 2020-09-15 ENCOUNTER — NON-APPOINTMENT (OUTPATIENT)
Age: 75
End: 2020-09-15

## 2020-09-18 ENCOUNTER — APPOINTMENT (OUTPATIENT)
Dept: INTERNAL MEDICINE | Facility: CLINIC | Age: 75
End: 2020-09-18

## 2020-09-19 DIAGNOSIS — A04.8 OTHER SPECIFIED BACTERIAL INTESTINAL INFECTIONS: ICD-10-CM

## 2020-09-22 DIAGNOSIS — I69.354 HEMIPLEGIA AND HEMIPARESIS FOLLOWING CEREBRAL INFARCTION AFFECTING LEFT NON-DOMINANT SIDE: ICD-10-CM

## 2020-09-22 DIAGNOSIS — E43 UNSPECIFIED SEVERE PROTEIN-CALORIE MALNUTRITION: ICD-10-CM

## 2020-09-22 DIAGNOSIS — I10 ESSENTIAL (PRIMARY) HYPERTENSION: ICD-10-CM

## 2020-09-22 DIAGNOSIS — I69.328 OTHER SPEECH AND LANGUAGE DEFICITS FOLLOWING CEREBRAL INFARCTION: ICD-10-CM

## 2020-09-22 DIAGNOSIS — E87.0 HYPEROSMOLALITY AND HYPERNATREMIA: ICD-10-CM

## 2020-09-22 DIAGNOSIS — E87.6 HYPOKALEMIA: ICD-10-CM

## 2020-09-22 DIAGNOSIS — N17.9 ACUTE KIDNEY FAILURE, UNSPECIFIED: ICD-10-CM

## 2020-09-22 DIAGNOSIS — E86.1 HYPOVOLEMIA: ICD-10-CM

## 2020-09-22 DIAGNOSIS — E86.0 DEHYDRATION: ICD-10-CM

## 2020-09-22 DIAGNOSIS — D53.9 NUTRITIONAL ANEMIA, UNSPECIFIED: ICD-10-CM

## 2020-09-22 DIAGNOSIS — D62 ACUTE POSTHEMORRHAGIC ANEMIA: ICD-10-CM

## 2020-09-22 DIAGNOSIS — E53.8 DEFICIENCY OF OTHER SPECIFIED B GROUP VITAMINS: ICD-10-CM

## 2020-09-22 DIAGNOSIS — K26.4 CHRONIC OR UNSPECIFIED DUODENAL ULCER WITH HEMORRHAGE: ICD-10-CM

## 2020-09-22 DIAGNOSIS — F32.9 MAJOR DEPRESSIVE DISORDER, SINGLE EPISODE, UNSPECIFIED: ICD-10-CM

## 2020-09-22 DIAGNOSIS — E87.2 ACIDOSIS: ICD-10-CM

## 2020-09-23 ENCOUNTER — APPOINTMENT (OUTPATIENT)
Dept: INTERNAL MEDICINE | Facility: CLINIC | Age: 75
End: 2020-09-23

## 2020-09-29 RX ORDER — PANTOPRAZOLE 40 MG/1
40 TABLET, DELAYED RELEASE ORAL TWICE DAILY
Qty: 28 | Refills: 0 | Status: ACTIVE | COMMUNITY
Start: 2020-09-29 | End: 1900-01-01

## 2020-10-03 DIAGNOSIS — K92.2 GASTROINTESTINAL HEMORRHAGE, UNSPECIFIED: ICD-10-CM

## 2020-10-04 ENCOUNTER — TRANSCRIPTION ENCOUNTER (OUTPATIENT)
Age: 75
End: 2020-10-04

## 2020-10-16 ENCOUNTER — APPOINTMENT (OUTPATIENT)
Dept: GASTROENTEROLOGY | Facility: CLINIC | Age: 75
End: 2020-10-16

## 2020-10-23 ENCOUNTER — NON-APPOINTMENT (OUTPATIENT)
Age: 75
End: 2020-10-23

## 2020-11-05 ENCOUNTER — TRANSCRIPTION ENCOUNTER (OUTPATIENT)
Age: 75
End: 2020-11-05

## 2020-11-09 DIAGNOSIS — Z87.19 PERSONAL HISTORY OF OTHER DISEASES OF THE DIGESTIVE SYSTEM: ICD-10-CM

## 2020-11-09 RX ORDER — CLARITHROMYCIN 500 MG/1
500 TABLET, FILM COATED ORAL TWICE DAILY
Qty: 28 | Refills: 0 | Status: DISCONTINUED | COMMUNITY
Start: 2020-09-29 | End: 2020-11-09

## 2020-11-09 RX ORDER — CYANOCOBALAMIN (VITAMIN B-12) 1000 MCG
1000 TABLET ORAL
Refills: 0 | Status: ACTIVE | COMMUNITY

## 2020-11-09 RX ORDER — LANSOPRAZOLE, AMOXICILLIN, CLARITHROMYCIN 30-500-500
KIT ORAL
Qty: 1 | Refills: 0 | Status: DISCONTINUED | COMMUNITY
Start: 2020-09-19 | End: 2020-11-09

## 2020-11-09 RX ORDER — FOLIC ACID 1 MG/1
1 TABLET ORAL
Refills: 0 | Status: ACTIVE | COMMUNITY

## 2020-11-09 RX ORDER — MIRTAZAPINE 7.5 MG/1
TABLET, FILM COATED ORAL
Refills: 0 | Status: ACTIVE | COMMUNITY

## 2020-11-09 RX ORDER — AMOXICILLIN 500 MG/1
500 TABLET, FILM COATED ORAL TWICE DAILY
Qty: 56 | Refills: 0 | Status: DISCONTINUED | COMMUNITY
Start: 2020-09-29 | End: 2020-11-09

## 2020-11-19 ENCOUNTER — TRANSCRIPTION ENCOUNTER (OUTPATIENT)
Age: 75
End: 2020-11-19

## 2021-01-04 NOTE — PROGRESS NOTE BEHAVIORAL HEALTH - FUND OF KNOWLEDGE
Normal

## 2023-03-09 NOTE — PATIENT PROFILE ADULT - SW.
social work Protopic Counseling: Patient may experience a mild burning sensation during topical application. Protopic is not approved in children less than 2 years of age. There have been case reports of hematologic and skin malignancies in patients using topical calcineurin inhibitors although causality is questionable.

## 2024-04-23 NOTE — PROGRESS NOTE ADULT - SUBJECTIVE AND OBJECTIVE BOX
[facility] [place]  Nurses Note -- 4 Eyes      4/23/2024       Skin assessed on: Q Shift      [x] No Pressure Injuries Present    [x]Prevention Measures Documented    [] Yes LDA  for Pressure Injury Previously documented     [] Yes New Pressure Injury Discovered   [] LDA for New Pressure Injury Added      Attending RN:  Luisa Polk RN     Second RN:  COREY Benitez       ***INCOMPLETE***    OVERNIGHT EVENTS:   No acute events overnight. Started Mirtazapine 7.5mg before bed    SUBJECTIVE:        OBJECTIVE:  Vital Signs Last 24 Hrs  T(C): 36.7 (10 Sep 2020 05:53), Max: 36.7 (10 Sep 2020 05:53)  T(F): 98.1 (10 Sep 2020 05:53), Max: 98.1 (10 Sep 2020 05:53)  HR: 98 (10 Sep 2020 05:53) (94 - 100)  BP: 108/65 (10 Sep 2020 05:53) (108/65 - 118/77)  BP(mean): --  RR: 20 (10 Sep 2020 05:53) (18 - 20)  SpO2: 98% (10 Sep 2020 05:53) (97% - 100%)    Physical Exam:      Labs:                        11.6   11.08 )-----------( 246      ( 08 Sep 2020 12:29 )             34.6     09-08    144  |  107  |  79<H>  ----------------------------<  133<H>  4.3   |  19<L>  |  1.26    Ca    8.9      08 Sep 2020 20:04    TPro  6.9  /  Alb  3.5  /  TBili  1.1  /  DBili  x   /  AST  14  /  ALT  7<L>  /  AlkPhos  31<L>  09-08    PT/INR - ( 08 Sep 2020 12:29 )   PT: 13.5 sec;   INR: 1.13          PTT - ( 08 Sep 2020 12:29 )  PTT:27.8 sec  Fingerstick  glucose:     MEDICATIONS  (STANDING):  cyanocobalamin 1000 MICROGram(s) Oral daily  dextrose 5% + sodium chloride 0.9%. 1000 milliLiter(s) (110 mL/Hr) IV Continuous <Continuous>  enoxaparin Injectable 40 milliGRAM(s) SubCutaneous every 24 hours  influenza   Vaccine 0.5 milliLiter(s) IntraMuscular once  mirtazapine 7.5 milliGRAM(s) Oral at bedtime    MEDICATIONS  (PRN):      Allergies    No Known Allergies    Intolerances        RADIOLOGY & ADDITIONAL TESTS: Reviewed.

## 2025-02-04 NOTE — ED ADULT TRIAGE NOTE - WEIGHT IN LBS
Saba Hurtado is receiving meropenem for L leg cellulitis thru 2/12     Followed by Dr Tanner    No new labs to review. Next RN visit is 2/6     RX contacted pt, left VM stating that pharmacy will send further doses and supplies out 2/5 and 2/7 to last until stop date of 2/12. Pt to call back with questions for Rph.    Dispensing 2/5 with supplies to match   6x mica HP  DOS 2/6-2/7    Dispensing 2/7 with supplies to match   15x mica HP  DOS 2/8-2/12    Follow up 2/12 POC Dr BURNETT      
199.9